# Patient Record
Sex: MALE | Race: WHITE | Employment: FULL TIME | URBAN - METROPOLITAN AREA
[De-identification: names, ages, dates, MRNs, and addresses within clinical notes are randomized per-mention and may not be internally consistent; named-entity substitution may affect disease eponyms.]

---

## 2017-10-04 ENCOUNTER — OFFICE VISIT (OUTPATIENT)
Dept: FAMILY MEDICINE | Facility: CLINIC | Age: 32
End: 2017-10-04
Payer: COMMERCIAL

## 2017-10-04 ENCOUNTER — TELEPHONE (OUTPATIENT)
Dept: FAMILY MEDICINE | Facility: CLINIC | Age: 32
End: 2017-10-04

## 2017-10-04 VITALS
BODY MASS INDEX: 33.49 KG/M2 | DIASTOLIC BLOOD PRESSURE: 78 MMHG | HEART RATE: 84 BPM | TEMPERATURE: 98.2 F | SYSTOLIC BLOOD PRESSURE: 122 MMHG | HEIGHT: 68 IN | WEIGHT: 221 LBS

## 2017-10-04 DIAGNOSIS — Z13.6 CARDIOVASCULAR SCREENING; LDL GOAL LESS THAN 160: ICD-10-CM

## 2017-10-04 DIAGNOSIS — Z00.00 ROUTINE HISTORY AND PHYSICAL EXAMINATION OF ADULT: Primary | ICD-10-CM

## 2017-10-04 DIAGNOSIS — Z23 FLU VACCINE NEED: ICD-10-CM

## 2017-10-04 PROCEDURE — 90686 IIV4 VACC NO PRSV 0.5 ML IM: CPT | Performed by: FAMILY MEDICINE

## 2017-10-04 PROCEDURE — 99385 PREV VISIT NEW AGE 18-39: CPT | Mod: 25 | Performed by: FAMILY MEDICINE

## 2017-10-04 PROCEDURE — 90471 IMMUNIZATION ADMIN: CPT | Performed by: FAMILY MEDICINE

## 2017-10-04 RX ORDER — DEXTROAMPHETAMINE SACCHARATE, AMPHETAMINE ASPARTATE, DEXTROAMPHETAMINE SULFATE AND AMPHETAMINE SULFATE 5; 5; 5; 5 MG/1; MG/1; MG/1; MG/1
20 TABLET ORAL 2 TIMES DAILY
COMMUNITY
End: 2021-06-29

## 2017-10-04 NOTE — PROGRESS NOTES
SUBJECTIVE:   CC: Boris Berumen is an 32 year old male who presents for preventative health visit.     Physical   Annual:     Getting at least 3 servings of Calcium per day::  Yes    Bi-annual eye exam::  NO    Dental care twice a year::  Yes    Sleep apnea or symptoms of sleep apnea::  Excessive snoring    Diet::  Regular (no restrictions)    Frequency of exercise::  4-5 days/week    Duration of exercise::  45-60 minutes    Taking medications regularly::  Yes    Medication side effects::  Not applicable    Additional concerns today::  YES (Has 2 forms he needs completed:  [employer] and [employer] )    ADHD. Inattentiveness for several years, diagnosed with ADHD two years ago. On Adderall, provided by another physician. No adverse effects.     Tobacco use. Smokes a couple of days a week, usually in social settings.     I reviewed his family history. No family history of premature CAD, breast cancer, colon cancer, or prostate cancer.       Today's PHQ-2 Score:   PHQ-2 ( 1999 Pf[employer]) 10/4/2017   Q1: Little interest or pleasure in doing things 0   Q2: Feeling down, depressed or hopeless 0   PHQ-2 Score 0   Q1: Little interest or pleasure in doing things Not at all   Q2: Feeling down, depressed or hopeless Not at all   PHQ-2 Score 0       Abuse: Current or Past(Physical, Sexual or Emotional)- No  Do you feel safe in your environment - Yes    Social History   Substance Use Topics     Smoking status: Not on file     Smokeless tobacco: Not on file     Alcohol use Not on file     The patient does not drink >3 drinks per day nor >7 drinks per week.    Last PSA: No results found for: PSA    Reviewed orders with patient. Reviewed health maintenance and updated orders accordingly - Yes  Labs reviewed in EPIC  BP Readings from Last 3 Encounters:   10/04/17 122/78    Wt Readings from Last 3 Encounters:   10/04/17 100.2 kg (221 lb)                  There is no problem list on file for this patient.    History  "reviewed. No pertinent surgical history.    Social History   Substance Use Topics     Smoking status: Current Some Day Smoker     Smokeless tobacco: Never Used     Alcohol use Yes      Comment: couple drinks per week socially     History reviewed. No pertinent family history.      Current Outpatient Prescriptions   Medication Sig Dispense Refill     amphetamine-dextroamphetamine (ADDERALL) 20 MG per tablet Take 20 mg by mouth 2 times daily       melatonin 0.5 mg TABS half-tab Take 1 mg by mouth nightly as needed for sleep       loratadine-pseudoePHEDrine (CLARITIN-D 12 HOUR) 5-120 MG per 12 hr tablet Take 1 tablet by mouth 2 times daily       Not on File  No lab results found.           Reviewed and updated as needed this visit by clinical staff         Reviewed and updated as needed this visit by Provider            ROS:  C: NEGATIVE for fever, chills, change in weight  I: NEGATIVE for worrisome rashes or lesions  E: NEGATIVE for vision changes or irritation  ENT: NEGATIVE for ear, mouth and throat problems  R: NEGATIVE for significant cough or SOB  CV: NEGATIVE for chest pain, palpitations or peripheral edema  GI: NEGATIVE for nausea, abdominal pain, heartburn, or change in bowel habits   male: negative for dysuria, hematuria, decreased urinary stream, erectile dysfunction, urethral discharge  M: NEGATIVE for significant arthralgias or myalgia  N: NEGATIVE for weakness, dizziness or paresthesias  P: NEGATIVE for changes in mood or affect    This document serves as a record of the services and decisions personally performed and made by Hawk Elias MD. It was created on their behalf by Matteo Clarke, a trained medical scribe. The creation of this document is based the provider's statements to the medical scribe.  Matteo Clarke October 4, 2017 1:12 PM       OBJECTIVE:   /78 (BP Location: Right arm, Cuff Size: Adult Large)  Pulse 84  Temp 98.2  F (36.8  C) (Oral)  Ht 1.734 m (5' 8.25\")  Wt 100.2 kg (221 lb)  " BMI 33.36 kg/m2    EXAM:  GENERAL: overweight, alert and no distress  EYES: Eyes grossly normal to inspection, PERRL and conjunctivae and sclerae normal  HENT: ear canals and TM's normal, nose and mouth without ulcers or lesions  NECK: no adenopathy, no asymmetry, masses, or scars and thyroid normal to palpation  RESP: lungs clear to auscultation - no rales, rhonchi or wheezes  CV: regular rate and rhythm, normal S1 S2, no S3 or S4, no murmur, click or rub, no peripheral edema and peripheral pulses strong  ABDOMEN: soft, nontender, no hepatosplenomegaly, no masses and bowel sounds normal  MS: no gross musculoskeletal defects noted, no edema  SKIN: no suspicious lesions or rashes -- numerous moles  NEURO: Normal strength and tone, mentation intact and speech normal  PSYCH: mentation appears normal, affect normal/bright  LYMPH: no cervical, supraclavicular, axillary, or inguinal adenopathy    ASSESSMENT/PLAN:   (Z00.00) Routine history and physical examination of adult  (primary encounter diagnosis)  Comment: Healthy lifestyle changes including changes in nutrition and exercise were discussed. Emphasized the importance of weight loss.   Plan: **Lipid panel reflex to direct LDL FUTURE         anytime, **Glucose FUTURE anytime, C FLU VAC         QUADRIVALENT SPLIT VIRUS 3+YRS IM        -follow up, pending results    (Z13.6) CARDIOVASCULAR SCREENING; LDL GOAL LESS THAN 160  Comment:   Plan: **Lipid panel reflex to direct LDL FUTURE         anytime        -await lab results    (Z23) Flu vaccine need  Comment:   Plan: C FLU VAC QUADRIVALENT SPLIT VIRUS 3+YRS IM                COUNSELING:   Reviewed preventive health counseling, as reflected in patient instructions       Regular exercise       Healthy diet/nutrition       Colon cancer screening       Prostate cancer screening       Testicular cancer screening    BP Screening:   Last 3 BP Readings:    BP Readings from Last 3 Encounters:   10/04/17 122/78       The following  "was recommended to the patient:  Re-screen BP within a year and recommended lifestyle modifications       reports that he has been smoking.  He has never used smokeless tobacco.  Tobacco Cessation Action Plan: Information offered: Patient not interested at this time  Estimated body mass index is 33.36 kg/(m^2) as calculated from the following:    Height as of this encounter: 1.734 m (5' 8.25\").    Weight as of this encounter: 100.2 kg (221 lb).   Weight management plan: Discussed healthy diet and exercise guidelines and patient will follow up in 12 months in clinic to re-evaluate.    Counseling Resources:  ATP IV Guidelines  Pooled Cohorts Equation Calculator  FRAX Risk Assessment  ICSI Preventive Guidelines  Dietary Guidelines for Americans, 2010  USDA's MyPlate  ASA Prophylaxis  Lung CA Screening    The information in this document, created by the medical scribe for me, accurately reflects the services I personally performed and the decisions made by me. I have reviewed and approved this document for accuracy prior to leaving the patient care area.    Robert Elias MD, MD  [facility]  Answers for HPI/ROS submitted by the patient on 10/4/2017   PHQ-2 Score: 0    "

## 2017-10-04 NOTE — PATIENT INSTRUCTIONS
Preventive Health Recommendations  Male Ages 26 - 39    Yearly exam:             See your health care provider every year in order to  o   Review health changes.   o   Discuss preventive care.    o   Review your medicines if your doctor has prescribed any.    You should be tested each year for STDs (sexually transmitted diseases), if you re at risk.     After age 35, talk to your provider about cholesterol testing. If you are at risk for heart disease, have your cholesterol tested at least every 5 years.     If you are at risk for diabetes, you should have a diabetes test (fasting glucose).  Shots: Get a flu shot each year. Get a tetanus shot every 10 years.     Nutrition:    Eat at least 5 servings of fruits and vegetables daily.     Eat whole-grain bread, whole-wheat pasta and brown rice instead of white grains and rice.     Talk to your provider about Calcium and Vitamin D.     Lifestyle    Exercise for at least 150 minutes a week (30 minutes a day, 5 days a week). This will help you control your weight and prevent disease.     Limit alcohol to one drink per day.     No smoking.     Wear sunscreen to prevent skin cancer.     See your dentist every six months for an exam and cleaning.   -follow up for labs only     Orders Placed This Encounter     C FLU VAC QUADRIVALENT SPLIT VIRUS 3+YRS IM     **Lipid panel reflex to direct LDL FUTURE anytime     Last Lab Result: No results found for: LDL     Standing Status:   Future     Standing Expiration Date:   10/4/2018     Order Specific Question:   Perform labs while fasting or non-fasting?     Answer:   Fasting     **Glucose FUTURE anytime     Standing Status:   Future     Standing Expiration Date:   10/4/2018     amphetamine-dextroamphetamine (ADDERALL) 20 MG per tablet     Sig: Take 20 mg by mouth 2 times daily     melatonin 0.5 mg TABS half-tab     Sig: Take 1 mg by mouth nightly as needed for sleep     loratadine-pseudoePHEDrine (CLARITIN-D 12 HOUR) 5-120 MG per 12  hr tablet     Sig: Take 1 tablet by mouth 2 times daily

## 2017-10-04 NOTE — MR AVS SNAPSHOT
After Visit Summary   10/4/2017    Boris Berumen    MRN: [id number]           Patient Information     Date Of Birth          1985        Visit Information        Provider Department      10/4/2017 12:40 PM Robert Elias MD [facility]        Today's Diagnoses     Routine history and physical examination of adult    -  1    CARDIOVASCULAR SCREENING; LDL GOAL LESS THAN 160        Flu vaccine need          Care Instructions      Preventive Health Recommendations  Male Ages 26 - 39    Yearly exam:             See your health care provider every year in order to  o   Review health changes.   o   Discuss preventive care.    o   Review your medicines if your doctor has prescribed any.    You should be tested each year for STDs (sexually transmitted diseases), if you re at risk.     After age 35, talk to your provider about cholesterol testing. If you are at risk for heart disease, have your cholesterol tested at least every 5 years.     If you are at risk for diabetes, you should have a diabetes test (fasting glucose).  Shots: Get a flu shot each year. Get a tetanus shot every 10 years.     Nutrition:    Eat at least 5 servings of fruits and vegetables daily.     Eat whole-grain bread, whole-wheat pasta and brown rice instead of white grains and rice.     Talk to your provider about Calcium and Vitamin D.     Lifestyle    Exercise for at least 150 minutes a week (30 minutes a day, 5 days a week). This will help you control your weight and prevent disease.     Limit alcohol to one drink per day.     No smoking.     Wear sunscreen to prevent skin cancer.     See your dentist every six months for an exam and cleaning.   -follow up for labs only     Orders Placed This Encounter     C FLU VAC QUADRIVALENT SPLIT VIRUS 3+YRS IM     **Lipid panel reflex to direct LDL FUTURE anytime     Last Lab Result: No results found for: LDL     Standing Status:   Future     Standing Expiration  "Date:   10/4/2018     Order Specific Question:   Perform labs while fasting or non-fasting?     Answer:   Fasting     **Glucose FUTURE anytime     Standing Status:   Future     Standing Expiration Date:   10/4/2018     amphetamine-dextroamphetamine (ADDERALL) 20 MG per tablet     Sig: Take 20 mg by mouth 2 times daily     melatonin 0.5 mg TABS half-tab     Sig: Take 1 mg by mouth nightly as needed for sleep     loratadine-pseudoePHEDrine (CLARITIN-D 12 HOUR) 5-120 MG per 12 hr tablet     Sig: Take 1 tablet by mouth 2 times daily               Follow-ups after your visit        Future tests that were ordered for you today     Open Future Orders        Priority Expected Expires Ordered    **Lipid panel reflex to direct LDL FUTURE anytime Routine 10/4/2017 10/4/2018 10/4/2017    **Glucose FUTURE anytime Routine 10/4/2017 10/4/2018 10/4/2017            Who to contact     If you have questions or need follow up information about today's clinic visit or your schedule please contact [facility] directly at [phone].  Normal or non-critical lab and imaging results will be communicated to you by [employer], letter or phone within 4 business days after the clinic has received the results. If you do not hear from us within 7 days, please contact the clinic through [employer] or phone. If you have a critical or abnormal lab result, we will notify you by phone as soon as possible.  Submit refill requests through [employer] or call your pharmacy and they will forward the refill request to us. Please allow 3 business days for your refill to be completed.          Additional Information About Your Visit        [employer] Information     [employer] lets you send messages to your doctor, view your test results, renew your prescriptions, schedule appointments and more. To sign up, go to [email or web address] . Click on \"Log in\" on the left side of the screen, which will take you to the Welcome page. Then click on \"Sign up " "Now\" on the right side of the page.     You will be asked to enter the access code listed below, as well as some personal information. Please follow the directions to create your username and password.     Your access code is: [email or web address]  Expires: 2018  1:42 PM     Your access code will  in 90 days. If you need help or a new code, please call your [place] clinic or [phone].        Care EveryWhere ID     This is your Care EveryWhere ID. This could be used by other organizations to access your [place] medical records  [id number]        Your Vitals Were     Pulse Temperature Height BMI (Body Mass Index)          84 98.2  F (36.8  C) (Oral) 5' 8.25\" (1.734 m) 33.36 kg/m2         Blood Pressure from Last 3 Encounters:   10/04/17 122/78    Weight from Last 3 Encounters:   10/04/17 221 lb (100.2 kg)              We Performed the Following     C FLU VAC QUADRIVALENT SPLIT VIRUS 3+YRS IM        Primary Care Provider    None Specified       No primary provider on file.        Equal Access to Services     [facility]: Hadii pollo Ferrera, waaxda lumarely, qaybta kaalmada tiffanie, anh mcknight . So [facility] [phone].    ATENCIÓN: Si habla español, tiene a murillo disposición servicios gratuitos de asistencia lingüística. Raegan al [phone].    We comply with applicable federal civil rights laws and [place] laws. We do not discriminate on the basis of race, color, national origin, age, disability, sex, sexual orientation, or gender identity.            Thank you!     Thank you for choosing [facility]  for your care. Our goal is always to provide you with excellent care. Hearing back from our patients is one way we can continue to improve our services. Please take a few minutes to complete the written survey that you may receive in the mail after your visit with us. Thank you!             Your Updated Medication List - Protect others around you: " Learn how to safely use, store and throw away your medicines at www.disposemymeds.org.          This list is accurate as of: 10/4/17  1:42 PM.  Always use your most recent med list.                   Brand Name Dispense Instructions for use Diagnosis    ADDERALL 20 MG per tablet   Generic drug:  amphetamine-dextroamphetamine      Take 20 mg by mouth 2 times daily        CLARITIN-D 12 HOUR 5-120 MG per 12 hr tablet   Generic drug:  loratadine-pseudoePHEDrine      Take 1 tablet by mouth 2 times daily        melatonin 0.5 mg Tabs half-tab      Take 1 mg by mouth nightly as needed for sleep

## 2017-10-04 NOTE — PROGRESS NOTES
Injectable Influenza Immunization Documentation    1.  Is the person to be vaccinated sick today?   No    2. Does the person to be vaccinated have an allergy to a component   of the vaccine?   No    3. Has the person to be vaccinated ever had a serious reaction   to influenza vaccine in the past?   No    4. Has the person to be vaccinated ever had Guillain-Barré syndrome?   No    Form completed by Simran Renae, Certified Medical Assistant (AAMA)

## 2017-10-04 NOTE — NURSING NOTE
Chief Complaint   Patient presents with     Physical       Initial There were no vitals taken for this visit. There is no height or weight on file to calculate BMI.  Medication Reconciliation: complete   Jenni Yepez, CMA

## 2017-10-04 NOTE — TELEPHONE ENCOUNTER
Patient was seen in clinic today for physical with Dr. Elias.  Dropped off 2 forms that he needs completed ([employer] biometric).    When lab results are in (for biometric form), please contact patient at home number when forms are completed and ready for him to  (FAX BIOMETRIC FORM FIRST TO:  [employer] fax #[phone])    Forms put into MA folder waiting for lab results    Jenni Yepez, CMA

## 2017-10-05 DIAGNOSIS — Z00.00 ROUTINE HISTORY AND PHYSICAL EXAMINATION OF ADULT: ICD-10-CM

## 2017-10-05 DIAGNOSIS — Z13.6 CARDIOVASCULAR SCREENING; LDL GOAL LESS THAN 160: ICD-10-CM

## 2017-10-05 PROCEDURE — 80061 LIPID PANEL: CPT | Performed by: FAMILY MEDICINE

## 2017-10-05 PROCEDURE — 36415 COLL VENOUS BLD VENIPUNCTURE: CPT | Performed by: FAMILY MEDICINE

## 2017-10-05 PROCEDURE — 82947 ASSAY GLUCOSE BLOOD QUANT: CPT | Performed by: FAMILY MEDICINE

## 2017-10-06 LAB
CHOLEST SERPL-MCNC: 187 MG/DL
GLUCOSE SERPL-MCNC: 94 MG/DL (ref 70–99)
HDLC SERPL-MCNC: 52 MG/DL
LDLC SERPL CALC-MCNC: 102 MG/DL
NONHDLC SERPL-MCNC: 135 MG/DL
TRIGL SERPL-MCNC: 163 MG/DL

## 2017-10-06 NOTE — TELEPHONE ENCOUNTER
Amador placed in Dr. Elias's sign me folder for review.    Madison Harding CMA ([facility])

## 2017-10-10 PROBLEM — F90.2 ATTENTION DEFICIT HYPERACTIVITY DISORDER (ADHD), COMBINED TYPE: Status: ACTIVE | Noted: 2017-10-10

## 2017-10-10 NOTE — TELEPHONE ENCOUNTER
Wife calling to check to on status of forms.  They are needed in order to get on the adoption list.  Please complete and call wife when they have been faxed, number listed above.

## 2017-10-11 NOTE — TELEPHONE ENCOUNTER
Forms from [employer] faxed to [phone].    [employer] forms needs to be signed by patient. Forms placed at  for pickup.    LMOM for patient. Unable to discuss with wife, no consent to communicate on file.    Maggi Hannah,

## 2017-10-13 NOTE — TELEPHONE ENCOUNTER
Patient stopped by the  and signed the forms below.  Per patient request a copy of the form was faxed to [phone]  [facility].  A copy was made for his chart and put in Medical records box, for scanning.    Morenita Carrneo  Patient Representative

## 2018-09-25 ENCOUNTER — THERAPY VISIT (OUTPATIENT)
Dept: PHYSICAL THERAPY | Facility: CLINIC | Age: 33
End: 2018-09-25
Payer: COMMERCIAL

## 2018-09-25 DIAGNOSIS — M25.511 ACUTE PAIN OF RIGHT SHOULDER: Primary | ICD-10-CM

## 2018-09-25 DIAGNOSIS — M25.562 ACUTE PAIN OF LEFT KNEE: ICD-10-CM

## 2018-09-25 PROCEDURE — 97110 THERAPEUTIC EXERCISES: CPT | Mod: GP | Performed by: PHYSICAL THERAPIST

## 2018-09-25 PROCEDURE — 97161 PT EVAL LOW COMPLEX 20 MIN: CPT | Mod: GP | Performed by: PHYSICAL THERAPIST

## 2018-09-25 PROCEDURE — 97112 NEUROMUSCULAR REEDUCATION: CPT | Mod: GP | Performed by: PHYSICAL THERAPIST

## 2018-09-25 ASSESSMENT — ACTIVITIES OF DAILY LIVING (ADL)
GO UP STAIRS: ACTIVITY IS SOMEWHAT DIFFICULT
LIMPING: THE SYMPTOM AFFECTS MY ACTIVITY MODERATELY
GIVING WAY, BUCKLING OR SHIFTING OF KNEE: THE SYMPTOM AFFECTS MY ACTIVITY SEVERELY
KNEE_ACTIVITY_OF_DAILY_LIVING_SCORE: 47.14
WALK: ACTIVITY IS MINIMALLY DIFFICULT
KNEE_ACTIVITY_OF_DAILY_LIVING_SUM: 33
RISE FROM A CHAIR: ACTIVITY IS MINIMALLY DIFFICULT
HOW_WOULD_YOU_RATE_THE_OVERALL_FUNCTION_OF_YOUR_KNEE_DURING_YOUR_USUAL_DAILY_ACTIVITIES?: ABNORMAL
AS_A_RESULT_OF_YOUR_KNEE_INJURY,_HOW_WOULD_YOU_RATE_YOUR_CURRENT_LEVEL_OF_DAILY_ACTIVITY?: SEVERELY ABNORMAL
SIT WITH YOUR KNEE BENT: ACTIVITY IS NOT DIFFICULT
KNEEL ON THE FRONT OF YOUR KNEE: I AM UNABLE TO DO THE ACTIVITY
WEAKNESS: THE SYMPTOM AFFECTS MY ACTIVITY SEVERELY
SQUAT: ACTIVITY IS VERY DIFFICULT
PAIN: THE SYMPTOM AFFECTS MY ACTIVITY SEVERELY
SWELLING: THE SYMPTOM AFFECTS MY ACTIVITY SEVERELY
STIFFNESS: THE SYMPTOM AFFECTS MY ACTIVITY MODERATELY
RAW_SCORE: 33
STAND: ACTIVITY IS MINIMALLY DIFFICULT
GO DOWN STAIRS: ACTIVITY IS MINIMALLY DIFFICULT
HOW_WOULD_YOU_RATE_THE_CURRENT_FUNCTION_OF_YOUR_KNEE_DURING_YOUR_USUAL_DAILY_ACTIVITIES_ON_A_SCALE_FROM_0_TO_100_WITH_100_BEING_YOUR_LEVEL_OF_KNEE_FUNCTION_PRIOR_TO_YOUR_INJURY_AND_0_BEING_THE_INABILITY_TO_PERFORM_ANY_OF_YOUR_USUAL_DAILY_ACTIVITIES?: 20

## 2018-09-25 NOTE — LETTER
[facility]  [address]  [place] [address]  [phone]    2018    Re: Boris Berumen   :   1985  MRN:  [id number]   REFERRING PHYSICIAN:   [facility]    Date of Initial Evaluation:  18  Visits:  Rxs Used: 1  Reason for Referral:     Acute pain of right shoulder  Acute pain of left knee    [facility] Initial Evaluation    Subjective:  HPI Comments: SPADI 25/100, greatest functional limit is laying on side at night and reaching to a high shelf   Knee Disability Index , greatest  functional limits are stair reciprocation and squatting   Patient is a 33 year old male presenting with rehab right shoulder hpi. The history is provided by the patient. No  was used.   Boris Berumen is a 33 year old male with a right shoulder condition.  Condition occurred with:  Lifting and contact with object.  Condition occurred: during recreation/sport.  This is a recurrent condition  18.    Patient reports pain:  Anterior and lateral.  Radiates to:  Shoulder.  Pain is described as aching and sharp and is intermittent and reported as 3/10.  Associated symptoms:  Loss of strength, painful arc and dislocating/subluxing. Pain is the same all the time and worse during the night.  Symptoms are exacerbated by carrying, lifting, lying on extremity, using arm behind back, using arm at shoulder level and using arm overhead and relieved by NSAID's.  Since onset symptoms are unchanged.  Special tests:  MRI and x-ray.    There was moderate improvement following previous treatment.  General health as reported by patient is good.  Pertinent medical history includes:  Smoking.  Medical allergies: no.  Other surgeries include:  None reported.  Current medications:  None as reported by the patient.  Current occupation is Unemployed .      Barriers include:  None as reported by the patient.  Red flags:  None as reported by  the patient.  Knee Activity of Daily Living Score: 47.14            Objective:  Standing Alignment:    Cervical/Thoracic:  Forward head  Shoulder/UE:  Rounded shoulders    Gait:    Gait Type:  Normal   Weight Bearing Status:  WBAT   Assistive Devices:  None    Neurological: He is alert. He has normal reflexes. No cranial nerve deficit or sensory deficit.        Shoulder Evaluation:  ROM:    PROM:    Flexion:  Left:  160    Right: 160    Internal Rotation:  Left:  90    Right:  90  External Rotation:  Left:  80    Right:  60, apprehensive     Strength:  : weak and painful Abduction, ER, Adduction, IR 4-/5 strength.    Stability Testing:    Right shoulder stability positive testing:Apprehension and Relocation    Special Tests:    Right shoulder positive for the following special tests:Labral and Impingement    Knee Evaluation:  ROM:  Strength wnl knee: strong quad set and SLR without lag, however: core, hip strength weak.    PROM  Hyperextension: Left: 5    Right:  5  Extension: Left: 0    Right:  0  Flexion: Left: 135    Right:  135    Ligament Testing:  Not Assessed    Special Tests:   Right knee positive for the following tests:  Patellar Compression    Palpation:  Palpation of knee: MPFL   Right knee tenderness present at:  Patellar Medial; Patellar Lateral and Patellar Inferior    Edema:  Normal    Assessment/Plan:    Patient is a 33 year old male with right side shoulder and right side knee complaints.    Patient has the following significant findings with corresponding treatment plan.                Diagnosis 1:  R anterior shoulder instability     Diagnosis 2:  R Patellar instability        Therapy Evaluation Codes:   1) History comprised of:   Personal factors that impact the plan of care:      Age, Time since onset of symptoms and repeated dislocation/instability .    Comorbidity factors that impact the plan of care are:      Smoking.     Medications impacting care: Anti-inflammatory.  2) Examination of Body  Systems comprised of:   Body structures and functions that impact the plan of care:      Knee and Shoulder.   Activity limitations that impact the plan of care are:      Cooking, Driving, Dressing, Lifting, Running, Sitting, Sports, Squatting/kneeling, Stairs, Standing, Walking, Working, Sleeping and Laying down.  3) Clinical presentation characteristics are:   Stable/Uncomplicated.  4) Decision-Making    Low complexity using standardized patient assessment instrument and/or measureable assessment of functional outcome.    Cumulative Therapy Evaluation is: Low complexity.  Previous and current functional limitations:  (See Goal Flow Sheet for this information)    Short term and Long term goals: (See Goal Flow Sheet for this information)   Communication ability:  Patient appears to be able to clearly communicate and understand verbal and written communication and follow directions correctly.  Treatment Explanation - The following has been discussed with the patient:   RX ordered/plan of care  Anticipated outcomes  Possible risks and side effects  This patient would benefit from PT intervention to resume normal activities.   Rehab potential is good.    Frequency:  1 X week, once daily  Duration:  for 6 weeks  Discharge Plan:  Achieve all LTG.  Independent in home treatment program.  Reach maximal therapeutic benefit.    Thank you for your referral.    INQUIRIES  Therapist: Elvis Quinn, PT, ATC, Cert. [facility]  [address] [place] [address]  Phone: [phone]  Fax: [phone]

## 2018-09-30 PROBLEM — M25.511 ACUTE PAIN OF RIGHT SHOULDER: Status: ACTIVE | Noted: 2018-09-30

## 2018-09-30 PROBLEM — M25.562 ACUTE PAIN OF LEFT KNEE: Status: ACTIVE | Noted: 2018-09-30

## 2018-10-02 ENCOUNTER — THERAPY VISIT (OUTPATIENT)
Dept: PHYSICAL THERAPY | Facility: CLINIC | Age: 33
End: 2018-10-02
Payer: COMMERCIAL

## 2018-10-02 DIAGNOSIS — M25.511 ACUTE PAIN OF RIGHT SHOULDER: ICD-10-CM

## 2018-10-02 DIAGNOSIS — M25.562 ACUTE PAIN OF LEFT KNEE: ICD-10-CM

## 2018-10-02 PROCEDURE — 97110 THERAPEUTIC EXERCISES: CPT | Mod: GP | Performed by: PHYSICAL THERAPIST

## 2018-10-02 PROCEDURE — 97112 NEUROMUSCULAR REEDUCATION: CPT | Mod: GP | Performed by: PHYSICAL THERAPIST

## 2018-10-02 NOTE — PROGRESS NOTES
[facility] Initial Evaluation  Subjective:  HPI Comments: SPADI 25/100, greatest functional limit is laying on side at night and reaching to a high shelf     Knee Disability Index , greatest  functional limits are stair reciprocation and squatting     Patient is a 33 year old male presenting with rehab right shoulder hpi. The history is provided by the patient. No  was used.   Boris Berumen is a 33 year old male with a right shoulder condition.  Condition occurred with:  Lifting and contact with object.  Condition occurred: during recreation/sport.  This is a recurrent condition  9/11/18.    Patient reports pain:  Anterior and lateral.  Radiates to:  Shoulder.  Pain is described as aching and sharp and is intermittent and reported as 3/10.  Associated symptoms:  Loss of strength, painful arc and dislocating/subluxing. Pain is the same all the time and worse during the night.  Symptoms are exacerbated by carrying, lifting, lying on extremity, using arm behind back, using arm at shoulder level and using arm overhead and relieved by NSAID's.  Since onset symptoms are unchanged.  Special tests:  MRI and x-ray.    There was moderate improvement following previous treatment.  General health as reported by patient is good.  Pertinent medical history includes:  Smoking.  Medical allergies: no.  Other surgeries include:  None reported.  Current medications:  None as reported by the patient.  Current occupation is Unemployed .        Barriers include:  None as reported by the patient.    Red flags:  None as reported by the patient.           Knee Activity of Daily Living Score: 47.14            Objective:  Standing Alignment:    Cervical/Thoracic:  Forward head  Shoulder/UE:  Rounded shoulders              Gait:    Gait Type:  Normal   Weight Bearing Status:  WBAT   Assistive Devices:  None        Neurological: He is alert. He has normal reflexes. No cranial nerve deficit or sensory  deficit.                      Shoulder Evaluation:  ROM:    PROM:    Flexion:  Left:  160    Right: 160          Internal Rotation:  Left:  90    Right:  90  External Rotation:  Left:  80    Right:  60, apprehensive                     Strength:  : weak and painful Abduction, ER, Adduction, IR 4-/5 strength.                      Stability Testing:      Right shoulder stability positive testing:Apprehension and Relocation  Special Tests:      Right shoulder positive for the following special tests:Labral and Impingement                                 Knee Evaluation:  ROM:  Strength wnl knee: strong quad set and SLR without lag, however: core, hip strength weak.    PROM    Hyperextension: Left: 5    Right:  5  Extension: Left: 0    Right:  0  Flexion: Left: 135    Right:  135        Ligament Testing:  Not Assessed                Special Tests:     Right knee positive for the following tests:  Patellar Compression  Palpation:  Palpation of knee: MPFL     Right knee tenderness present at:  Patellar Medial; Patellar Lateral and Patellar Inferior  Edema:  Normal            General     ROS    Assessment/Plan:    Patient is a 33 year old male with right side shoulder and right side knee complaints.    Patient has the following significant findings with corresponding treatment plan.                Diagnosis 1:  R anterior shoulder instability     Diagnosis 2:  R Patellar instability        Therapy Evaluation Codes:   1) History comprised of:   Personal factors that impact the plan of care:      Age, Time since onset of symptoms and repeated dislocation/instability .    Comorbidity factors that impact the plan of care are:      Smoking.     Medications impacting care: Anti-inflammatory.  2) Examination of Body Systems comprised of:   Body structures and functions that impact the plan of care:      Knee and Shoulder.   Activity limitations that impact the plan of care are:      Cooking, Driving, Dressing, Lifting, Running,  Sitting, Sports, Squatting/kneeling, Stairs, Standing, Walking, Working, Sleeping and Laying down.  3) Clinical presentation characteristics are:   Stable/Uncomplicated.  4) Decision-Making    Low complexity using standardized patient assessment instrument and/or measureable assessment of functional outcome.  Cumulative Therapy Evaluation is: Low complexity.    Previous and current functional limitations:  (See Goal Flow Sheet for this information)    Short term and Long term goals: (See Goal Flow Sheet for this information)     Communication ability:  Patient appears to be able to clearly communicate and understand verbal and written communication and follow directions correctly.  Treatment Explanation - The following has been discussed with the patient:   RX ordered/plan of care  Anticipated outcomes  Possible risks and side effects  This patient would benefit from PT intervention to resume normal activities.   Rehab potential is good.    Frequency:  1 X week, once daily  Duration:  for 6 weeks  Discharge Plan:  Achieve all LTG.  Independent in home treatment program.  Reach maximal therapeutic benefit.    Please refer to the daily flowsheet for treatment today, total treatment time and time spent performing 1:1 timed codes.

## 2018-12-06 ENCOUNTER — OFFICE VISIT (OUTPATIENT)
Dept: FAMILY MEDICINE | Facility: CLINIC | Age: 33
End: 2018-12-06
Payer: COMMERCIAL

## 2018-12-06 VITALS
WEIGHT: 210.6 LBS | SYSTOLIC BLOOD PRESSURE: 128 MMHG | RESPIRATION RATE: 20 BRPM | DIASTOLIC BLOOD PRESSURE: 68 MMHG | HEIGHT: 69 IN | BODY MASS INDEX: 31.19 KG/M2 | HEART RATE: 85 BPM | TEMPERATURE: 98.4 F | OXYGEN SATURATION: 100 %

## 2018-12-06 DIAGNOSIS — Z87.898 HISTORY OF SNORING: ICD-10-CM

## 2018-12-06 DIAGNOSIS — F17.200 TOBACCO USE DISORDER: ICD-10-CM

## 2018-12-06 DIAGNOSIS — M25.361 PATELLAR INSTABILITY OF RIGHT KNEE: ICD-10-CM

## 2018-12-06 DIAGNOSIS — Z01.818 PREOP GENERAL PHYSICAL EXAM: Primary | ICD-10-CM

## 2018-12-06 PROCEDURE — 93000 ELECTROCARDIOGRAM COMPLETE: CPT | Performed by: NURSE PRACTITIONER

## 2018-12-06 PROCEDURE — 99214 OFFICE O/P EST MOD 30 MIN: CPT | Performed by: NURSE PRACTITIONER

## 2018-12-06 ASSESSMENT — PAIN SCALES - GENERAL: PAINLEVEL: NO PAIN (0)

## 2018-12-06 NOTE — MR AVS SNAPSHOT
After Visit Summary   12/6/2018    Boris Berumen    MRN: [id number]           Patient Information     Date Of Birth          1985        Visit Information        Provider Department      12/6/2018 7:40 AM Verna Morel NP [facility]        Today's Diagnoses     Preop general physical exam    -  1    Patellar instability of right knee        History of snoring        Tobacco use disorder        Need for prophylactic vaccination and inoculation against influenza          Care Instructions    Pre-operation Instructions:    - Stop Aspirin and NSAIDS (Ibuprofen, Motrin, Advil, Aleve, Naproxen, Naprosyn) 7 days prior to the surgery/procedure or follow surgeon's direction.    - Stop all Vitamins and Herbal Supplements (including Vitamin E, Fish Oil, Omega 3 Fatty Acids) 7 days prior to the surgery/procedure or follow surgeon's direction.    - Medications:  Continue your medications the morning of your surgery/procedure.    - If you become sick before your surgery/procedure (such as a fever, cough, congestion, or sore throat) you should call your primary care provider and surgeon.    - Unless given permission by your surgeon, do not eat or drink after midnight in the evening prior to your surgery/procedure.   Before Your Surgery      Call your surgeon if there is any change in your health. This includes signs of a cold or flu (such as a sore throat, runny nose, cough, rash or fever).    Do not smoke, drink alcohol or take over the counter medicine (unless your surgeon or primary care doctor tells you to) for the 24 hours before and after surgery.    If you take prescribed drugs: Follow your doctor s orders about which medicines to take and which to stop until after surgery.    Eating and drinking prior to surgery: follow the instructions from your surgeon    Take a shower or bath the night before surgery. Use the soap your surgeon gave you to gently clean your skin. If you do not  have soap from your surgeon, use your regular soap. Do not shave or scrub the surgery site.  Wear clean pajamas and have clean sheets on your bed.     Learning to [place]      Coping is the key to successfully living a life without cigarettes.    You have unconsciously connected smoking with many behaviors and feeling that you experience every day of your life.  Engaging in that behavior or experiencing that feeling automatically triggers a desire for a cigarette. Unless you do something to prevent those urges from occurring and learn to deal with the urges that do occur, you may be tempted back to smoking. Coping breaks all those connections and allows you to live a life free of cigarettes.  Coping does not mean that you have to completely stop living your life and join a monastery! It does mean that you must work at changing how you do many of the routines that prompt you to smoke. It also means changing how you think in those tempting situations.  These techniques are all simple and doable. But they are powerful. Research and practical experience has proven time and again that these techniques help to eliminate urges as well as give you the tools to deal with urges that manage to slip through.  Throughout the next few pages you will find literally hundreds of suggestions on how to deal with situations that trigger most smokers to smoke.  Think about the situations where you have been especially tempted to smoke in the past. Refer to the coping suggestions for each of those situations. Then, determine the best coping choices for you. These techniques will be your  weapons of choice  the next time you encounter that situation. It is important to pick one coping technique to change what you do and one to change how you think for each trigger. Combining techniques makes them even stronger and increases your ability to successfully cope.  Even though there are plenty of excellent coping suggestions here, these are by no  means all the techniques that exist. So, if you have an idea that s not listed here don t be afraid to use it. Be creative!  Finally remember that no matter how many excellent ideas you come up with you must actually put them into practice. Work at this for at least six to eight weeks and you ll quickly learn to deal with any tempting situation that may come along!        Coping Menu    Preventing Urges    There are many things you can do before you get into a tempting situation to eliminate the urge to smoke.    Visualize yourself comfortably dealing with the situation without a cigarette.    Plan ahead. Know what you will do in any given situation before you encounter it. Practice that plan often.    Avoid the situation until you feel you can deal with it.    Change the routines you associate with smoking as much as possible.    Rethink your belief that smoking somehow makes your life better or helps you deal with all your problems.    Begin an exercise program. If you can t do anything else just walk as briskly as you can every day for half an hour.    Keep yourself busy. Avoid boring situations where you may begin to think about smoking.    Remind yourself often that you are happy being a nonsmoker and that life is much better without cigarettes.  Coping with temptation  However, sometimes the urge manages to come through. You must be ready to cope with that urge as it s happening. The following suggestions will help you deal with that urge so you aren t tempted back to cigarettes.  General Suggestions    Deep Breathing. Every time an urge hits take in a slow deep breath, hold it for three to five seconds and then slowly exhale.    Drink a glass of water.    Talk about the urge. Call your support person or let people around you know you need to talk for a few minutes.    Escape the situation. Leave until you feel comfortable going back.    Picture a stop sign in your head or say the word loudly to  yourself.    Count to twenty!    Say to yourself,  I am in control  or  I can get through this.     Just accept the though. It s natural that you will have thoughts about cigarettes once you quit. Don t make a big deal out of them. Say to yourself  So what  and let the thought go.    Specific Situations  After Meals    Get up from the table as soon as you are done eating    Brush your teeth after every meal    Always sit in the nonsmoking section of a restaurant    At home have dessert and coffee in a different place from dinner    Take a short walk after each meal  Alcohol    Explore alternative ways to socialize with friends  o Go to a movie  o Work out together  o Have a party without alcohol    If you choose to drink  o Change what you usually drink  o Limit yourself to one or two drinks  o Talk about the urges when they occur  o Leave the bar periodically for fresh air (Do some deep breathing while outside).    Decide not to go to a bar for at least the first few weeks of your quit    Remind yourself that you can have fun without drinking. Millions of people do it all the time!  Boredom    Always carry a book/newspaper/crossword puzzle with you    Plan ahead so that you will not have long periods of inactivity    Learn to enjoy doing nothing from time to time. You do not always have to be doing something important    Use idle time to make the grocery list, plan your schedule or write letters    Start a new hobby or begin an exercise program to fill the time.  Breaks    Take your break at a different time    Change the place where you take your break    Take a short walk instead of staying indoors    Do a crossword puzzle or read a novel    Realize that you don t need an excuse to take a break. You deserve it!      Car    Choose a slightly different route for routine trips    Remove the ashtray from the car    Listen to a talk radio station or books on tape to keep your mind occupied    Use public transportation  for the first few weeks after you quit    Change the environment in the car. Clean the entire interior; get new seat covers, put up a no smoking sign, etc.  Coffee    Drink a flavored coffee or a different brand    Drink coffee out of a glass, paper cup, or the good china you never use    Change where you have your coffee breaks at work    If you always have your morning coffee at home have it at a café or at work    Drink tea instead of coffee  Evenings    Find projects to do while at home. Clean out the basement, refinish furniture, etc.    Keep yourself occupied while watching TV. Do puzzles, make out the grocery list, read a magazine    Visit family or friends instead of staying at home    Begin a new hobby or volunteer at a [employer] organization    Start an exercise program. If you can t do anything else, take a brisk half hour walk each night  Hand/Mouth    Use cinnamon sticks (the kind used for cider)    Suck on sugar free candy    Use straws/swizzle sticks/tooth picks    Chew strong tangy sugar free gum    Eat carrots or celery sticks  Living with another smoker    Negotiate with the other smoker about where and when he/she will smoke. Do not make demands    Have the other smoker keep his/her cigarettes where you will not be able to find them    Practice saying  No thank you, I don t  smoke   just in case someone offers you a cigarette    Don t drink alcohol or limit yourself to one or two drinks    Have a support person with you at the party    Stress Management    Separate cigarettes from the situation. Realize that smoking never made a situation any better or helped you deal with it.    Step back, take a deep breath, and say to yourself,  I can handle this.  Then deal with the problem.    Strategize about how to handle stressful situations with friends, relatives or trusted clergy before encountering those situations.    Realize that every problem has a solution that does not involve smoking.    Begin  an exercise program, take a formal stress management class or learn to meditate.   Telephone    Stand instead of sit    Move the location of the phone    If you don t already have one, get a portable phone or a cell phone    Hold the phone in the hand opposite of the one you usually use    Limit your time on the phone (use email instead)!  Thoughts about smoking    Just because you think about something does not mean you have to do it. Remember, if you did everything you ever thought about you would be in [facility] right now!!    Don t focus on the thought. Distract yourself:  o Say to yourself  I am in control  and let the thought go  o Remind yourself of the benefits of quitting  o Think of the reason you quit. Focus on that  o Say the word stop or picture a stop sign    Accept the thoughts. You naturally will be thinking about cigarettes for a while after you quit. Say to yourself,  So what  and move on    See yourself in your mind s eye as a successful nonsmoker. Practice seeing yourself in all kinds of situations dealing effectively without smoking    Give the smoker one ashtray. They will keep this ashtray clean and out of your sight when not in use    Determine a reasonable length of time for these changes    Surprise the smoker with a special dinner or gift at the end of your first month of quitting as a thank you for their cooperation.  Morning Routine    Change the order of your routine    Jump into the shower as soon as you get up    Eat something for breakfast if you normally do not    If you listen to the radio turn on the TV or vice versa    Look into the mirror first thing each morning and say,  I m proud to be a nonsmoker!   Negative Moods    Rethink your belief that cigarettes will calm or relax you    Ask yourself how a cigarette will make the situation any better    Do deep breathing throughout the day    As you do the deep breathing, think calming thoughts. Say to yourself,  I can get through this  or  simply  I am calm.     Realize that smoking does not hurt anyone but yourself. Smoking is not a good way to  get back  at anyone or to punish someone you are angry with  Other Smokers    Avoid places where you know people are smoking for the first few weeks of your quit    Leave the scene from time to time if you have to be in a smoking environment    Politely explain to the person that you are trying to quit and ask them not to smoke around you    Ask yourself what is still appealing about seeing other people smoke    Realize that the smoker is not happier or having more fun than you are just because they are smoking  Parties/Socializing    Before you go develop and practice a plan to deal with situations    Rehearse going to the function. Close your eyes and see yourself having a good time, meeting people, and enjoying the music all without a cigarette  Weight Gain    Do not diet.  Attempting two major behavior changes at the same time usually leads to failure at both. Wait at least two or three months after quitting before tackling any weight loss program.    Remember, the average weight gain, as a direct result of quitting, is only five to seven pounds. Any weight gain over and above that is due to behavioral changes on the part of the quitter    Drink six to eight glasses of water a day    Begin a modest exercise program. If you can do nothing else, take a brisk half hour walk every day    Remember, smoking does not turn your body into a fat burning machine. If it did, every smoker would be about 100 pounds!!!        Work    Rearrange your office or work space if you can    Put a  No Smoking  sign or motivation poster in your work area    Change your work routine as much as possible    Listen to music, talk radio, or tapes    Have a support person at work            Follow-ups after your visit        Follow-up notes from your care team     Return in about 1 year (around 12/6/2019) for Physical Exam.      Who to  "contact     If you have questions or need follow up information about today's clinic visit or your schedule please contact [facility] directly at [phone].  Normal or non-critical lab and imaging results will be communicated to you by MyChart, letter or phone within 4 business days after the clinic has received the results. If you do not hear from us within 7 days, please contact the clinic through MyChart or phone. If you have a critical or abnormal lab result, we will notify you by phone as soon as possible.  Submit refill requests through [employer] or call your pharmacy and they will forward the refill request to us. Please allow 3 business days for your refill to be completed.          Additional Information About Your Visit        Care EveryWhere ID     This is your Care EveryWhere ID. This could be used by other organizations to access your [facility] medical records  [id number]        Your Vitals Were     Pulse Temperature Respirations Height Pulse Oximetry BMI (Body Mass Index)    85 98.4  F (36.9  C) (Oral) 20 5' 9\" (1.753 m) 100% 31.1 kg/m2       Blood Pressure from Last 3 Encounters:   12/06/18 128/68   10/04/17 122/78    Weight from Last 3 Encounters:   12/06/18 210 lb 9.6 oz (95.5 kg)   10/04/17 221 lb (100.2 kg)              We Performed the Following     EKG 12-lead complete w/read - Clinics        Primary Care Provider Fax #    Physician No Ref-Primary [phone]       No address on file        Equal Access to Services     [place] SAAVEDRA : Hadii pollo rochao Sochanoali, waaxda luqadaha, qaybta kaalmada adeegyada, anh mcknight . So [facility] [phone].    ATENCIÓN: Si habla español, tiene a murillo disposición servicios gratuitos de asistencia lingüística. Llame al [phone].    We comply with applicable federal civil rights laws and [place] laws. We do not discriminate on the basis of race, color, national origin, age, disability, sex, sexual " orientation, or gender identity.            Thank you!     Thank you for choosing [facility]  for your care. Our goal is always to provide you with excellent care. Hearing back from our patients is one way we can continue to improve our services. Please take a few minutes to complete the written survey that you may receive in the mail after your visit with us. Thank you!             Your Updated Medication List - Protect others around you: Learn how to safely use, store and throw away your medicines at www.disposemymeds.org.          This list is accurate as of 12/6/18  8:21 AM.  Always use your most recent med list.                   Brand Name Dispense Instructions for use Diagnosis    ADDERALL 20 MG tablet   Generic drug:  amphetamine-dextroamphetamine      Take 20 mg by mouth 2 times daily        CLARITIN-D 12 HOUR 5-120 MG 12 hr tablet   Generic drug:  loratadine-pseudoePHEDrine      Take 1 tablet by mouth 2 times daily        melatonin 0.5 mg Tabs half-tab      Take 1 mg by mouth nightly as needed for sleep

## 2018-12-06 NOTE — PROGRESS NOTES
[address] [id number] [phone]  Dept: [phone]    PRE-OP EVALUATION:  Today's date: 2018    Boris Berumen (: 1985) presents for pre-operative evaluation assessment as requested by Dr. Santos.  He requires evaluation and anesthesia risk assessment prior to undergoing surgery/procedure for treatment of right knee .    Fax number for surgical facility: [phone]  Primary Physician: No Ref-Primary, Physician  Type of Anesthesia Anticipated: to be determined    Patient has a Health Care Directive or Living Will:  NO    Preop Questions 2018   Who is doing your surgery? dr sam davila   What are you having done? mpfl (medial patellofemoral ligament) reconstruction right knee   Date of Surgery/Procedure: 18   Facility or Hospital where procedure/surgery will be performed: [facility]    1.  Do you have a history of Heart attack, stroke, stent, coronary bypass surgery, or other heart surgery? No   2.  Do you ever have any pain or discomfort in your chest? No   3.  Do you have a history of  Heart Failure? No   4.   Are you troubled by shortness of breath when:  walking on a level surface, or up a slight hill, or at night? No   5.  Do you currently have a cold, bronchitis or other respiratory infection? No   6.  Do you have a cough, shortness of breath, or wheezing? No   7.  Do you sometimes get pains in the calves of your legs when you walk? No   8. Do you or anyone in your family have previous history of blood clots? No   9.  Do you or does anyone in your family have a serious bleeding problem such as prolonged bleeding following surgeries or cuts? No   10. Have you ever had problems with anemia or been told to take iron pills? No   11. Have you had any abnormal blood loss such as black, tarry or bloody stools? No   12. Have you ever had a blood transfusion? No   13. Have you or any of your relatives ever had problems with  anesthesia? No   14. Do you have sleep apnea, excessive snoring or daytime drowsiness? YES - snores, never had sleep study   15. Do you have any prosthetic heart valves? No   16. Do you have prosthetic joints? No         HPI:     HPI related to upcoming procedure: Since 9/2018 he has had instability of right patella that causes him pain.  He had done Physical Therapy.      See problem list for active medical problems.  Problems all longstanding and stable, except as noted/documented.  See ROS for pertinent symptoms related to these conditions.                                                                                                                                                          .    MEDICAL HISTORY:     Patient Active Problem List    Diagnosis Date Noted     Tobacco use disorder 12/06/2018     Priority: Medium     History of snoring 12/06/2018     Priority: Medium     Acute pain of right shoulder 09/30/2018     Priority: Medium     Acute pain of left knee 09/30/2018     Priority: Medium     Attention deficit hyperactivity disorder (ADHD), combined type 10/10/2017     Priority: Medium     CARDIOVASCULAR SCREENING; LDL GOAL LESS THAN 160 10/04/2017     Priority: Medium      No past medical history on file.  Past Surgical History:   Procedure Laterality Date     HC TOOTH EXTRACTION W/FORCEP      age 10     pyloric stenosis surgical repair as a baby       TONSILLECTOMY & ADENOIDECTOMY       Current Outpatient Prescriptions   Medication Sig Dispense Refill     amphetamine-dextroamphetamine (ADDERALL) 20 MG per tablet Take 20 mg by mouth 2 times daily       loratadine-pseudoePHEDrine (CLARITIN-D 12 HOUR) 5-120 MG per 12 hr tablet Take 1 tablet by mouth 2 times daily       melatonin 0.5 mg TABS half-tab Take 1 mg by mouth nightly as needed for sleep       OTC products: None, except as noted above    Allergies   Allergen Reactions     Mold Cough, Other (See Comments), Itching and Shortness Of Breath      "Pollen Extract Cough, Itching and Other (See Comments)      Latex Allergy: NO    Social History   Substance Use Topics     Smoking status: Current Some Day Smoker     Packs/day: 0.50     Start date: 1/1/2001     Smokeless tobacco: Never Used     Alcohol use Yes      Comment: couple drinks per week socially     History   Drug Use No       REVIEW OF SYSTEMS:   Constitutional, neuro, ENT, endocrine, pulmonary, cardiac, gastrointestinal, genitourinary, musculoskeletal, integument and psychiatric systems are negative, except as otherwise noted.    EXAM:   /68 (BP Location: Right arm, Patient Position: Chair, Cuff Size: Adult Large)  Pulse 85  Temp 98.4  F (36.9  C) (Oral)  Resp 20  Ht 5' 9\" (1.753 m)  Wt 210 lb 9.6 oz (95.5 kg)  SpO2 100%  BMI 31.1 kg/m2    GENERAL APPEARANCE: healthy, alert, no distress and over weight     EYES: EOMI,  PERRL     HENT: ear canals and TM's normal and nose and mouth without ulcers or lesions     NECK: no adenopathy, no asymmetry, masses, or scars and thyroid normal to palpation     RESP: lungs clear to auscultation - no rales, rhonchi or wheezes     CV: regular rates and rhythm, normal S1 S2, no S3 or S4 and no murmur, click or rub     ABDOMEN:  soft, nontender, no HSM or masses and bowel sounds normal     MS: extremities normal- no gross deformities noted, no evidence of inflammation in joints, FROM in all extremities.     SKIN: no suspicious lesions or rashes     NEURO: Normal strength and tone, sensory exam grossly normal, mentation intact and speech normal     PSYCH: mentation appears normal. and affect normal/bright     LYMPHATICS: No cervical adenopathy    DIAGNOSTICS:   EKG: appears normal, NSR, normal axis, normal intervals, no acute ST/T changes c/w ischemia, no LVH by voltage criteria, unchanged from previous tracings    IMPRESSION:   Reason for surgery/procedure: right patellar instability  Diagnosis/reason for consult: pre op exam    The proposed surgical procedure " is considered INTERMEDIATE risk.    REVISED CARDIAC RISK INDEX  The patient has the following serious cardiovascular risks for perioperative complications such as (MI, PE, VFib and 3  AV Block):  No serious cardiac risks  INTERPRETATION: 0 risks: Class I (very low risk - 0.4% complication rate)    The patient has the following additional risks for perioperative complications:  H/o snoring- possible undiagnosed sleep apnea      ICD-10-CM    1. Preop general physical exam Z01.818    2. Patellar instability of right knee M25.361    3. History of snoring Z87.898    4. Tobacco use disorder F17.200 EKG 12-lead complete w/read - Clinics       RECOMMENDATIONS:       Obstructive Sleep Apnea (or suspected sleep apnea)  Hospital staff are advised to monitor for sleep related oxygen desaturations due to suspicion of GUSTAVO      --Patient is to take all scheduled medications on the day of surgery EXCEPT for modifications listed below.    APPROVAL GIVEN to proceed with proposed procedure, without further diagnostic evaluation       Signed Electronically by: Verna Morel NP    Copy of this evaluation report is provided to requesting physician.    [facility] Preop Guidelines    Revised Cardiac Risk Index

## 2018-12-06 NOTE — PATIENT INSTRUCTIONS
Pre-operation Instructions:    - Stop Aspirin and NSAIDS (Ibuprofen, Motrin, Advil, Aleve, Naproxen, Naprosyn) 7 days prior to the surgery/procedure or follow surgeon's direction.    - Stop all Vitamins and Herbal Supplements (including Vitamin E, Fish Oil, Omega 3 Fatty Acids) 7 days prior to the surgery/procedure or follow surgeon's direction.    - Medications:  Continue your medications the morning of your surgery/procedure.    - If you become sick before your surgery/procedure (such as a fever, cough, congestion, or sore throat) you should call your primary care provider and surgeon.    - Unless given permission by your surgeon, do not eat or drink after midnight in the evening prior to your surgery/procedure.   Before Your Surgery      Call your surgeon if there is any change in your health. This includes signs of a cold or flu (such as a sore throat, runny nose, cough, rash or fever).    Do not smoke, drink alcohol or take over the counter medicine (unless your surgeon or primary care doctor tells you to) for the 24 hours before and after surgery.    If you take prescribed drugs: Follow your doctor s orders about which medicines to take and which to stop until after surgery.    Eating and drinking prior to surgery: follow the instructions from your surgeon    Take a shower or bath the night before surgery. Use the soap your surgeon gave you to gently clean your skin. If you do not have soap from your surgeon, use your regular soap. Do not shave or scrub the surgery site.  Wear clean pajamas and have clean sheets on your bed.     Learning to [place]      Coping is the key to successfully living a life without cigarettes.    You have unconsciously connected smoking with many behaviors and feeling that you experience every day of your life.  Engaging in that behavior or experiencing that feeling automatically triggers a desire for a cigarette. Unless you do something to prevent those urges from occurring and learn  to deal with the urges that do occur, you may be tempted back to smoking. Coping breaks all those connections and allows you to live a life free of cigarettes.  Coping does not mean that you have to completely stop living your life and join a monastery! It does mean that you must work at changing how you do many of the routines that prompt you to smoke. It also means changing how you think in those tempting situations.  These techniques are all simple and doable. But they are powerful. Research and practical experience has proven time and again that these techniques help to eliminate urges as well as give you the tools to deal with urges that manage to slip through.  Throughout the next few pages you will find literally hundreds of suggestions on how to deal with situations that trigger most smokers to smoke.  Think about the situations where you have been especially tempted to smoke in the past. Refer to the coping suggestions for each of those situations. Then, determine the best coping choices for you. These techniques will be your  weapons of choice  the next time you encounter that situation. It is important to pick one coping technique to change what you do and one to change how you think for each trigger. Combining techniques makes them even stronger and increases your ability to successfully cope.  Even though there are plenty of excellent coping suggestions here, these are by no means all the techniques that exist. So, if you have an idea that s not listed here don t be afraid to use it. Be creative!  Finally remember that no matter how many excellent ideas you come up with you must actually put them into practice. Work at this for at least six to eight weeks and you ll quickly learn to deal with any tempting situation that may come along!        Coping Menu    Preventing Urges    There are many things you can do before you get into a tempting situation to eliminate the urge to smoke.    Visualize yourself  comfortably dealing with the situation without a cigarette.    Plan ahead. Know what you will do in any given situation before you encounter it. Practice that plan often.    Avoid the situation until you feel you can deal with it.    Change the routines you associate with smoking as much as possible.    Rethink your belief that smoking somehow makes your life better or helps you deal with all your problems.    Begin an exercise program. If you can t do anything else just walk as briskly as you can every day for half an hour.    Keep yourself busy. Avoid boring situations where you may begin to think about smoking.    Remind yourself often that you are happy being a nonsmoker and that life is much better without cigarettes.  Coping with temptation  However, sometimes the urge manages to come through. You must be ready to cope with that urge as it s happening. The following suggestions will help you deal with that urge so you aren t tempted back to cigarettes.  General Suggestions    Deep Breathing. Every time an urge hits take in a slow deep breath, hold it for three to five seconds and then slowly exhale.    Drink a glass of water.    Talk about the urge. Call your support person or let people around you know you need to talk for a few minutes.    Escape the situation. Leave until you feel comfortable going back.    Picture a stop sign in your head or say the word loudly to yourself.    Count to twenty!    Say to yourself,  I am in control  or  I can get through this.     Just accept the though. It s natural that you will have thoughts about cigarettes once you quit. Don t make a big deal out of them. Say to yourself  So what  and let the thought go.    Specific Situations  After Meals    Get up from the table as soon as you are done eating    Brush your teeth after every meal    Always sit in the nonsmoking section of a restaurant    At home have dessert and coffee in a different place from dinner    Take a short  walk after each meal  Alcohol    Explore alternative ways to socialize with friends  o Go to a movie  o Work out together  o Have a party without alcohol    If you choose to drink  o Change what you usually drink  o Limit yourself to one or two drinks  o Talk about the urges when they occur  o Leave the bar periodically for fresh air (Do some deep breathing while outside).    Decide not to go to a bar for at least the first few weeks of your quit    Remind yourself that you can have fun without drinking. Millions of people do it all the time!  Boredom    Always carry a book/newspaper/crossword puzzle with you    Plan ahead so that you will not have long periods of inactivity    Learn to enjoy doing nothing from time to time. You do not always have to be doing something important    Use idle time to make the grocery list, plan your schedule or write letters    Start a new hobby or begin an exercise program to fill the time.  Breaks    Take your break at a different time    Change the place where you take your break    Take a short walk instead of staying indoors    Do a crossword puzzle or read a novel    Realize that you don t need an excuse to take a break. You deserve it!      Car    Choose a slightly different route for routine trips    Remove the ashtray from the car    Listen to a talk radio station or books on tape to keep your mind occupied    Use public transportation for the first few weeks after you quit    Change the environment in the car. Clean the entire interior; get new seat covers, put up a no smoking sign, etc.  Coffee    Drink a flavored coffee or a different brand    Drink coffee out of a glass, paper cup, or the good china you never use    Change where you have your coffee breaks at work    If you always have your morning coffee at home have it at a café or at work    Drink tea instead of coffee  Evenings    Find projects to do while at home. Clean out the basement, refinish furniture,  etc.    Keep yourself occupied while watching TV. Do puzzles, make out the grocery list, read a magazine    Visit family or friends instead of staying at home    Begin a new hobby or volunteer at a worthwhile organization    Start an exercise program. If you can t do anything else, take a brisk half hour walk each night  Hand/Mouth    Use cinnamon sticks (the kind used for cider)    Suck on sugar free candy    Use straws/swizzle sticks/tooth picks    Chew strong tangy sugar free gum    Eat carrots or celery sticks  Living with another smoker    Negotiate with the other smoker about where and when he/she will smoke. Do not make demands    Have the other smoker keep his/her cigarettes where you will not be able to find them    Practice saying  No thank you, I don t  smoke   just in case someone offers you a cigarette    Don t drink alcohol or limit yourself to one or two drinks    Have a support person with you at the party    Stress Management    Separate cigarettes from the situation. Realize that smoking never made a situation any better or helped you deal with it.    Step back, take a deep breath, and say to yourself,  I can handle this.  Then deal with the problem.    Strategize about how to handle stressful situations with friends, relatives or trusted clergy before encountering those situations.    Realize that every problem has a solution that does not involve smoking.    Begin an exercise program, take a formal stress management class or learn to meditate.   Telephone    Stand instead of sit    Move the location of the phone    If you don t already have one, get a portable phone or a cell phone    Hold the phone in the hand opposite of the one you usually use    Limit your time on the phone (use email instead)!  Thoughts about smoking    Just because you think about something does not mean you have to do it. Remember, if you did everything you ever thought about you would be in [facility] right now!!    Don t focus  on the thought. Distract yourself:  o Say to yourself  I am in control  and let the thought go  o Remind yourself of the benefits of quitting  o Think of the reason you quit. Focus on that  o Say the word stop or picture a stop sign    Accept the thoughts. You naturally will be thinking about cigarettes for a while after you quit. Say to yourself,  So what  and move on    See yourself in your mind s eye as a successful nonsmoker. Practice seeing yourself in all kinds of situations dealing effectively without smoking    Give the smoker one ashtray. They will keep this ashtray clean and out of your sight when not in use    Determine a reasonable length of time for these changes    Surprise the smoker with a special dinner or gift at the end of your first month of quitting as a thank you for their cooperation.  Morning Routine    Change the order of your routine    Jump into the shower as soon as you get up    Eat something for breakfast if you normally do not    If you listen to the radio turn on the TV or vice versa    Look into the mirror first thing each morning and say,  I m proud to be a nonsmoker!   Negative Moods    Rethink your belief that cigarettes will calm or relax you    Ask yourself how a cigarette will make the situation any better    Do deep breathing throughout the day    As you do the deep breathing, think calming thoughts. Say to yourself,  I can get through this  or simply  I am calm.     Realize that smoking does not hurt anyone but yourself. Smoking is not a good way to  get back  at anyone or to punish someone you are angry with  Other Smokers    Avoid places where you know people are smoking for the first few weeks of your quit    Leave the scene from time to time if you have to be in a smoking environment    Politely explain to the person that you are trying to quit and ask them not to smoke around you    Ask yourself what is still appealing about seeing other people smoke    Realize that the  smoker is not happier or having more fun than you are just because they are smoking  Parties/Socializing    Before you go develop and practice a plan to deal with situations    Rehearse going to the function. Close your eyes and see yourself having a good time, meeting people, and enjoying the music all without a cigarette  Weight Gain    Do not diet.  Attempting two major behavior changes at the same time usually leads to failure at both. Wait at least two or three months after quitting before tackling any weight loss program.    Remember, the average weight gain, as a direct result of quitting, is only five to seven pounds. Any weight gain over and above that is due to behavioral changes on the part of the quitter    Drink six to eight glasses of water a day    Begin a modest exercise program. If you can do nothing else, take a brisk half hour walk every day    Remember, smoking does not turn your body into a fat burning machine. If it did, every smoker would be about 100 pounds!!!        Work    Rearrange your office or work space if you can    Put a  No Smoking  sign or motivation poster in your work area    Change your work routine as much as possible    Listen to music, talk radio, or tapes    Have a support person at work

## 2018-12-07 ENCOUNTER — TELEPHONE (OUTPATIENT)
Dept: FAMILY MEDICINE | Facility: CLINIC | Age: 33
End: 2018-12-07

## 2018-12-27 ENCOUNTER — THERAPY VISIT (OUTPATIENT)
Dept: PHYSICAL THERAPY | Facility: CLINIC | Age: 33
End: 2018-12-27
Payer: COMMERCIAL

## 2018-12-27 DIAGNOSIS — M25.561 ACUTE PAIN OF RIGHT KNEE: Primary | ICD-10-CM

## 2018-12-27 PROCEDURE — 97112 NEUROMUSCULAR REEDUCATION: CPT | Mod: GP | Performed by: PHYSICAL THERAPIST

## 2018-12-27 PROCEDURE — 97161 PT EVAL LOW COMPLEX 20 MIN: CPT | Mod: GP | Performed by: PHYSICAL THERAPIST

## 2018-12-27 PROCEDURE — 97110 THERAPEUTIC EXERCISES: CPT | Mod: GP | Performed by: PHYSICAL THERAPIST

## 2018-12-27 ASSESSMENT — ACTIVITIES OF DAILY LIVING (ADL)
RISE FROM A CHAIR: ACTIVITY IS MINIMALLY DIFFICULT
STAND: ACTIVITY IS MINIMALLY DIFFICULT
SQUAT: I AM UNABLE TO DO THE ACTIVITY
STIFFNESS: THE SYMPTOM AFFECTS MY ACTIVITY SEVERELY
SIT WITH YOUR KNEE BENT: I AM UNABLE TO DO THE ACTIVITY
SWELLING: THE SYMPTOM AFFECTS MY ACTIVITY MODERATELY
GIVING WAY, BUCKLING OR SHIFTING OF KNEE: THE SYMPTOM AFFECTS MY ACTIVITY SEVERELY
KNEE_ACTIVITY_OF_DAILY_LIVING_SCORE: 40
RAW_SCORE: 28
LIMPING: THE SYMPTOM AFFECTS MY ACTIVITY SEVERELY
PAIN: THE SYMPTOM AFFECTS MY ACTIVITY SLIGHTLY
KNEE_ACTIVITY_OF_DAILY_LIVING_SUM: 28
AS_A_RESULT_OF_YOUR_KNEE_INJURY,_HOW_WOULD_YOU_RATE_YOUR_CURRENT_LEVEL_OF_DAILY_ACTIVITY?: ABNORMAL
WALK: ACTIVITY IS MINIMALLY DIFFICULT
HOW_WOULD_YOU_RATE_THE_CURRENT_FUNCTION_OF_YOUR_KNEE_DURING_YOUR_USUAL_DAILY_ACTIVITIES_ON_A_SCALE_FROM_0_TO_100_WITH_100_BEING_YOUR_LEVEL_OF_KNEE_FUNCTION_PRIOR_TO_YOUR_INJURY_AND_0_BEING_THE_INABILITY_TO_PERFORM_ANY_OF_YOUR_USUAL_DAILY_ACTIVITIES?: 30
HOW_WOULD_YOU_RATE_THE_OVERALL_FUNCTION_OF_YOUR_KNEE_DURING_YOUR_USUAL_DAILY_ACTIVITIES?: SEVERELY ABNORMAL
GO DOWN STAIRS: ACTIVITY IS MINIMALLY DIFFICULT
GO UP STAIRS: ACTIVITY IS MINIMALLY DIFFICULT
KNEEL ON THE FRONT OF YOUR KNEE: I AM UNABLE TO DO THE ACTIVITY
WEAKNESS: THE SYMPTOM PREVENTS ME FROM ALL DAILY ACTIVITIES

## 2018-12-27 NOTE — LETTER
[facility]  [address]  [place] [address]  [phone]    2018    Re: Boris Berumen   :   1985  MRN:  [id number]   REFERRING PHYSICIAN:   [facility]    Date of Initial Evaluation:  18  Visits:  Rxs Used: 1  Reason for Referral:  Acute pain of right knee    [facility] Initial Evaluation    Subjective:  The history is provided by the patient. No  was used.   Knee Activity of Daily Living Score: 40            Objective:  Standing Alignment:    Cervical/Thoracic:  Forward head  Shoulder/UE:  Rounded shoulders    Knee:  Genu valgus R and genu valgus L    Gait:    Gait Type:  Antalgic   Weight Bearing Status:  WBAT   Assistive Devices:  Brace and crutches  Neurological: He is alert. He has normal strength and normal reflexes. No cranial nerve deficit or sensory deficit.     Knee Evaluation:  ROM:  Strength wnl knee: Weak SLR control. Quad Set 3/5.     PROM  Hyperextension: Left:   Right:  0  Extension: Left:   Right:  0  Flexion: Left:   Right:  65    Ligament Testing:  Not Assessed    Special Tests: Not Assessed    Palpation:  Palpation of knee: clean and healing well. Ballotable swelling global knee .   Right knee tenderness present at:  Incisional    Assessment/Plan:    Patient is a 33 year old male with right side knee complaints.    Patient has the following significant findings with corresponding treatment plan.                Diagnosis 1:  R knee MPFL , post op 18  Pain -  hot/cold therapy, self management, education, directional preference exercise and home program  Decreased ROM/flexibility - manual therapy and therapeutic exercise  Decreased strength - therapeutic exercise and therapeutic activities  Impaired gait - gait training    Therapy Evaluation Codes:   1) History comprised of:   Personal factors that impact the plan of care:      MPFL Reconstruction Rehabilitation  Protocol.    Comorbidity factors that impact the plan of care are:      Overweight and Smoking.     Medications impacting care: Pain.  2) Examination of Body Systems comprised of:   Body structures and functions that impact the plan of care:      Knee.   Activity limitations that impact the plan of care are:      Driving, Dressing, Lifting, Sitting, Sports, Squatting/kneeling, Stairs, Standing, Walking and Sleeping.  3) Clinical presentation characteristics are:   Stable/Uncomplicated.  4) Decision-Making    Low complexity using standardized patient assessment instrument and/or measureable assessment of functional outcome.    Cumulative Therapy Evaluation is: Low complexity.  Previous and current functional limitations:  (See Goal Flow Sheet for this information)    Short term and Long term goals: (See Goal Flow Sheet for this information)   Communication ability:  Patient appears to be able to clearly communicate and understand verbal and written communication and follow directions correctly.  Treatment Explanation - The following has been discussed with the patient:   RX ordered/plan of care  Anticipated outcomes  Possible risks and side effects  This patient would benefit from PT intervention to resume normal activities.   Rehab potential is good.    Frequency:  1 X week, once daily  Duration:  for 6 weeks  Discharge Plan:  Achieve all LTG.  Independent in home treatment program.  Reach maximal therapeutic benefit.    Thank you for your referral.    INQUIRIES  Therapist: Elvis Quinn, PT, ATC, Cert. [facility]  [address] [place] [address]  Phone: [phone]  Fax: [phone]

## 2018-12-30 NOTE — PROGRESS NOTES
[facility] Initial Evaluation  Subjective:  The history is provided by the patient. No  was used.          Knee Activity of Daily Living Score: 40            Objective:  Standing Alignment:    Cervical/Thoracic:  Forward head  Shoulder/UE:  Rounded shoulders        Knee:  Genu valgus R and genu valgus L      Gait:    Gait Type:  Antalgic   Weight Bearing Status:  WBAT   Assistive Devices:  Brace and crutches        Neurological: He is alert. He has normal strength and normal reflexes. No cranial nerve deficit or sensory deficit.                                                   Knee Evaluation:  ROM:  Strength wnl knee: Weak SLR control. Quad Set 3/5.     PROM    Hyperextension: Left:   Right:  0  Extension: Left:   Right:  0  Flexion: Left:   Right:  65        Ligament Testing:  Not Assessed                Special Tests: Not Assessed      Palpation:  Palpation of knee: clean and healing well. Ballotable swelling global knee .     Right knee tenderness present at:  Incisional            General     ROS    Assessment/Plan:    Patient is a 33 year old male with right side knee complaints.    Patient has the following significant findings with corresponding treatment plan.                Diagnosis 1:  R knee MPFL , post op 12/13/18  Pain -  hot/cold therapy, self management, education, directional preference exercise and home program  Decreased ROM/flexibility - manual therapy and therapeutic exercise  Decreased strength - therapeutic exercise and therapeutic activities  Impaired gait - gait training    Therapy Evaluation Codes:   1) History comprised of:   Personal factors that impact the plan of care:      MPFL Reconstruction Rehabilitation Protocol.    Comorbidity factors that impact the plan of care are:      Overweight and Smoking.     Medications impacting care: Pain.  2) Examination of Body Systems comprised of:   Body structures and functions that impact the plan of  care:      Knee.   Activity limitations that impact the plan of care are:      Driving, Dressing, Lifting, Sitting, Sports, Squatting/kneeling, Stairs, Standing, Walking and Sleeping.  3) Clinical presentation characteristics are:   Stable/Uncomplicated.  4) Decision-Making    Low complexity using standardized patient assessment instrument and/or measureable assessment of functional outcome.  Cumulative Therapy Evaluation is: Low complexity.    Previous and current functional limitations:  (See Goal Flow Sheet for this information)    Short term and Long term goals: (See Goal Flow Sheet for this information)     Communication ability:  Patient appears to be able to clearly communicate and understand verbal and written communication and follow directions correctly.  Treatment Explanation - The following has been discussed with the patient:   RX ordered/plan of care  Anticipated outcomes  Possible risks and side effects  This patient would benefit from PT intervention to resume normal activities.   Rehab potential is good.    Frequency:  1 X week, once daily  Duration:  for 6 weeks  Discharge Plan:  Achieve all LTG.  Independent in home treatment program.  Reach maximal therapeutic benefit.    Please refer to the daily flowsheet for treatment today, total treatment time and time spent performing 1:1 timed codes.

## 2019-01-03 ENCOUNTER — THERAPY VISIT (OUTPATIENT)
Dept: PHYSICAL THERAPY | Facility: CLINIC | Age: 34
End: 2019-01-03
Payer: COMMERCIAL

## 2019-01-03 DIAGNOSIS — M25.561 ACUTE PAIN OF RIGHT KNEE: Primary | ICD-10-CM

## 2019-01-03 PROCEDURE — 97110 THERAPEUTIC EXERCISES: CPT | Mod: GP | Performed by: PHYSICAL THERAPIST

## 2019-01-03 PROCEDURE — 97112 NEUROMUSCULAR REEDUCATION: CPT | Mod: GP | Performed by: PHYSICAL THERAPIST

## 2019-01-15 ENCOUNTER — THERAPY VISIT (OUTPATIENT)
Dept: PHYSICAL THERAPY | Facility: CLINIC | Age: 34
End: 2019-01-15
Payer: COMMERCIAL

## 2019-01-15 DIAGNOSIS — M25.561 ACUTE PAIN OF RIGHT KNEE: Primary | ICD-10-CM

## 2019-01-15 PROCEDURE — 97112 NEUROMUSCULAR REEDUCATION: CPT | Mod: GP | Performed by: PHYSICAL THERAPIST

## 2019-01-15 PROCEDURE — 97110 THERAPEUTIC EXERCISES: CPT | Mod: GP | Performed by: PHYSICAL THERAPIST

## 2019-01-15 NOTE — LETTER
[facility]  [address]  [place] [address]  [phone]    2019    Re: Boris Berumen   :   1985  MRN:  [id number]   REFERRING PHYSICIAN:   [facility]    Date of Initial Evaluation:  18  Visits:  Rxs Used: 3  Reason for Referral:  Acute pain of right knee    PROGRESS  REPORT  Progress reporting period is from 18 to 1/15/18.     SUBJECTIVE  Subjective: Boris is roughly 1 month post op R knee MPFL. On balance, he is near painfree, gradually increasing strength and mobility. His incisions are clean and healing well. He is slightly more irritated to the medial incision along with local swelling that seems to be new for him.    Current Pain level: 10   Initial Pain level: 5/10   Changes in function: Yes, see goal flow sheet for change in function     OBJECTIVE  Knee  -110 assisted flexion ROM , full extension, good quad set contraction.   Patient is able to SLR without lag.     Noted limp yet during gait. Continues to use hinged brace     Plan to gradually increase kayy brace as well as stay the course with post op care progression to proximal hip strength, transition to CKC strength, core  strength and full smooth gait within the next 4 weeks.   Follow up appointment pklanned with his MD tomorrow for consult. Special directive for MD to consult about medial incision swelling.       ASSESSMENT/PLAN  Updated problem list and treatment plan: Diagnosis 1:   R knee MPFL     STG/LTGs have been met or progress has been made towards goals:  Yes (See Goal flow sheet completed today.)  Assessment of Progress: The patient's condition is improving.  The patient's condition has potential to improve.  Self Management Plans:  Patient has been instructed in a home treatment program.    Recommendations:  This patient would benefit from continued therapy.     Frequency:  1 X week,  To every other once daily  Duration:  for 6  weeks    Follow up with Dr. Santos 1/16/19    Thank you for your referral.    INQUIRIES  Therapist:Elvis Quinn, PT, ATC, Cert. MDT  [facility]  [address]  [place] [address]  Phone: [phone]  Fax: [phone]

## 2019-01-16 NOTE — PROGRESS NOTES
Subjective:  HPI                    Objective:  System    Physical Exam    General     ROS    Assessment/Plan:    PROGRESS  REPORT    Progress reporting period is from 12/30/18 to 1/15/18.     SUBJECTIVE  Subjective: Boris is roughly 1 month post op R knee MPFL. On balance, he is near painfree, gradually increasing strength and mobility. His incisions are clean and healing well. He is slightly more irritated to the medial incision along with local swelling that seems to be new for him.    Current Pain level: 1/10   Initial Pain level: 5/10   Changes in function: Yes, see goal flow sheet for change in function    ;   ,         OBJECTIVE  Knee  -110 assisted flexion ROM , full extension, good quad set contraction.     Patient is able to SLR without lag.     Noted limp yet during gait. Continues to use hinged brace     Plan to gradually increase kayy brace as well as stay the course with post op care progression to proximal hip strength, transition to CKC strength, core  strength and full smooth gait within the next 4 weeks.     Follow up appointment pklanned with his MD tomorrow for consult. Special directive for MD to consult about medial incision swelling.       ASSESSMENT/PLAN  Updated problem list and treatment plan: Diagnosis 1:   R knee MPFL     STG/LTGs have been met or progress has been made towards goals:  Yes (See Goal flow sheet completed today.)  Assessment of Progress: The patient's condition is improving.  The patient's condition has potential to improve.    Self Management Plans:  Patient has been instructed in a home treatment program.      Recommendations:  This patient would benefit from continued therapy.     Frequency:  1 X week,  To every other once daily  Duration:  for 6 weeks    Follow up with Dr. Santos 1/16/19        Please refer to the daily flowsheet for treatment today, total treatment time and time spent performing 1:1 timed codes.

## 2019-02-01 ENCOUNTER — THERAPY VISIT (OUTPATIENT)
Dept: PHYSICAL THERAPY | Facility: CLINIC | Age: 34
End: 2019-02-01
Payer: COMMERCIAL

## 2019-02-01 DIAGNOSIS — M25.561 ACUTE PAIN OF RIGHT KNEE: Primary | ICD-10-CM

## 2019-02-01 DIAGNOSIS — Z98.890 S/P RECONSTRUCTION OF LIGAMENT OF KNEE JOINT: ICD-10-CM

## 2019-02-01 PROCEDURE — 97110 THERAPEUTIC EXERCISES: CPT | Mod: GP | Performed by: PHYSICAL THERAPIST

## 2019-02-01 PROCEDURE — 97112 NEUROMUSCULAR REEDUCATION: CPT | Mod: GP | Performed by: PHYSICAL THERAPIST

## 2019-02-01 NOTE — LETTER
[facility]   [address]  [place] [id number] [phone]    2019    Re: Boris Berumen   :   1985  MRN:  [id number]   REFERRING PHYSICIAN:   [facility]    Date of Initial Evaluation:  18  Visits:  Rxs Used: 4  Reason for Referral:     Acute pain of right knee  S/P reconstruction of ligament of knee joint    PROGRESS  REPORT  Progress reporting period is from 18 to 19.     SUBJECTIVE   Boris is 6-7 weeks post op MPFL Reconstruction. Patient is FWB without a limp. Effusion resolved. Full Knee extension  and ROM goal met to 120-130. Patient is returning to normal stair reciprocation.   Next step is advancing [facility] drill exerises avoid functional valgus.    Initial Pain level: 5/10   Changes in function: Yes, see goal flow sheet for change in function     OBJECTIVE  Assisted ROM 0-130. very good quad set. Excellent functional control of knee valgum with eccentric load.   Continue large muscle group strengthening, proprioception .   Goal over next 2-4 weeks is full stair reciproation, ROM full by week 12       ASSESSMENT/PLAN  Updated problem list and treatment plan: Diagnosis 1:  R knee MPFL Reconstruction     Assessment of Progress: The patient's condition is improving.  The patient's condition has potential to improve.  Self Management Plans:  Patient has been instructed in a home treatment program.    Recommendations:  This patient would benefit from continued therapy.     Frequency:  1 X week, to every other week once daily  Duration:  for 12 weeks    Rehab Plans: continue to advance Phase III MPFL program eder the next month and re-assess functional gains at that point as well as MD follow up.     Thank you for your referral.    INQUIRIES  Therapist: Elvis Quinn, PT, ATC, Cert. MDT  [facility]   [address]  [place] [address]  Phone: [phone]  Fax: [phone]

## 2019-02-04 NOTE — PROGRESS NOTES
Subjective:  HPI                    Objective:  System    Physical Exam    General     ROS    Assessment/Plan:    PROGRESS  REPORT    Progress reporting period is from 12/27/18 to 2/1/19.     SUBJECTIVE   Boris is 6-7 weeks post op MPFL Reconstruction. Patient is FWB without a limp. Effusion resolved. Full Knee extension  and ROM goal met to 120-130. Patient is returning to normal stair reciprocation.     Next step is advancing CKC drill exerises avoid functional valgus.        Initial Pain level: 5/10   Changes in function: Yes, see goal flow sheet for change in function    ;   ,         OBJECTIVE  : Assisted ROM 0-130. very good quad set. Excellent functional control of knee valgum with eccentric load.     Continue large muscle group strengthening, proprioception .     Goal over next 2-4 weeks is full stair reciproation, ROM full by week 12       ASSESSMENT/PLAN  Updated problem list and treatment plan: Diagnosis 1:  R knee MPFL Reconstruction       Assessment of Progress: The patient's condition is improving.  The patient's condition has potential to improve.  Self Management Plans:  Patient has been instructed in a home treatment program.  I      Recommendations:  This patient would benefit from continued therapy.     Frequency:  1 X week, to every other week once daily  Duration:  for 12 weeks    Rehab Plans: continue to advance Phase III MPFL program eder the next month and re-assess functional gains at that point as well as MD follow up.     Please refer to the daily flowsheet for treatment today, total treatment time and time spent performing 1:1 timed codes.

## 2019-02-13 ENCOUNTER — OFFICE VISIT (OUTPATIENT)
Dept: FAMILY MEDICINE | Facility: CLINIC | Age: 34
End: 2019-02-13
Payer: COMMERCIAL

## 2019-02-13 VITALS
HEART RATE: 96 BPM | RESPIRATION RATE: 22 BRPM | TEMPERATURE: 98.5 F | WEIGHT: 212 LBS | BODY MASS INDEX: 31.4 KG/M2 | SYSTOLIC BLOOD PRESSURE: 130 MMHG | DIASTOLIC BLOOD PRESSURE: 80 MMHG | HEIGHT: 69 IN | OXYGEN SATURATION: 98 %

## 2019-02-13 DIAGNOSIS — S43.431D TEAR OF RIGHT GLENOID LABRUM, SUBSEQUENT ENCOUNTER: ICD-10-CM

## 2019-02-13 DIAGNOSIS — Z01.818 PREOP GENERAL PHYSICAL EXAM: Primary | ICD-10-CM

## 2019-02-13 DIAGNOSIS — F90.2 ATTENTION DEFICIT HYPERACTIVITY DISORDER (ADHD), COMBINED TYPE: ICD-10-CM

## 2019-02-13 DIAGNOSIS — E78.5 HYPERLIPIDEMIA LDL GOAL <130: ICD-10-CM

## 2019-02-13 LAB
ANION GAP SERPL CALCULATED.3IONS-SCNC: 5 MMOL/L (ref 3–14)
BUN SERPL-MCNC: 13 MG/DL (ref 7–30)
CALCIUM SERPL-MCNC: 9.5 MG/DL (ref 8.5–10.1)
CHLORIDE SERPL-SCNC: 104 MMOL/L (ref 94–109)
CHOLEST SERPL-MCNC: 195 MG/DL
CO2 SERPL-SCNC: 29 MMOL/L (ref 20–32)
CREAT SERPL-MCNC: 1.04 MG/DL (ref 0.66–1.25)
GFR SERPL CREATININE-BSD FRML MDRD: >90 ML/MIN/{1.73_M2}
GLUCOSE SERPL-MCNC: 94 MG/DL (ref 70–99)
HDLC SERPL-MCNC: 39 MG/DL
HGB BLD-MCNC: 14.4 G/DL (ref 13.3–17.7)
LDLC SERPL CALC-MCNC: 116 MG/DL
NONHDLC SERPL-MCNC: 156 MG/DL
POTASSIUM SERPL-SCNC: 4.8 MMOL/L (ref 3.4–5.3)
SODIUM SERPL-SCNC: 138 MMOL/L (ref 133–144)
TRIGL SERPL-MCNC: 201 MG/DL

## 2019-02-13 PROCEDURE — 80048 BASIC METABOLIC PNL TOTAL CA: CPT | Performed by: FAMILY MEDICINE

## 2019-02-13 PROCEDURE — 99214 OFFICE O/P EST MOD 30 MIN: CPT | Performed by: FAMILY MEDICINE

## 2019-02-13 PROCEDURE — 85018 HEMOGLOBIN: CPT | Performed by: FAMILY MEDICINE

## 2019-02-13 PROCEDURE — 36415 COLL VENOUS BLD VENIPUNCTURE: CPT | Performed by: FAMILY MEDICINE

## 2019-02-13 PROCEDURE — 80061 LIPID PANEL: CPT | Performed by: FAMILY MEDICINE

## 2019-02-13 RX ORDER — IBUPROFEN 200 MG
600 TABLET ORAL EVERY 8 HOURS PRN
COMMUNITY
End: 2020-11-18

## 2019-02-13 ASSESSMENT — PAIN SCALES - GENERAL: PAINLEVEL: NO PAIN (0)

## 2019-02-13 ASSESSMENT — MIFFLIN-ST. JEOR: SCORE: 1887.01

## 2019-02-13 NOTE — LETTER
"[address] [address]  [phone]                                                                                                February 18, 2019    Boris Berumen  [address] [facility] [address]        Dear Mr. Berumen,      The results of your recent lab tests were overall within normal limits. The cholesterol is slightly elevated but not at a level that requires medications.    Lipid goals:    Cholesterol: Desirable is less than 200, Borderline is 200-240  HDL (Good Cholesterol): Desirable is greater than 40  LDL (Bad Cholesterol): Desirable is less than 130, Borderline 130-160  Triglycerides: Desirable is less than 150,  Borderline is 150-300    Ways to improve your cholesterol...    1- Eats less saturated fats (including avoiding \"trans\" fats).    2 - Eat more unsaturated fats  - found in vegetables, grains, and tree nuts.  Also by replacing butter with canola oil or olive oil.    3 - Eat more nuts.  1-2 ounces (a small handful) of almonds, walnuts, hazelnuts or pecans once a day in place of other less healthy snacks.    4 - Eat more high fiber foods - vegetables and whole grains including oat bran, oats, beans, peas, and flax seed.    5 - Eat more fish - such as salmon, tuna, mackerel, and sardines.  1 or 2 six ounce servings per week is a healthy replacement for other proteins.    6 - Exercise for at least 120 minutes per week - which is equal to 30 minutes 4 days per week.       Enclosed is a copy of these results.  If you have any further questions or problems, please contact our office.  Results for orders placed or performed in visit on 02/13/19   Basic metabolic panel   Result Value Ref Range    Sodium 138 133 - 144 mmol/L    Potassium 4.8 3.4 - 5.3 mmol/L    Chloride 104 94 - 109 mmol/L    Carbon Dioxide 29 20 - 32 mmol/L    Anion Gap 5 3 - 14 mmol/L    Glucose 94 70 - 99 mg/dL    Urea Nitrogen 13 7 - 30 mg/dL    Creatinine 1.04 " 0.66 - 1.25 mg/dL    GFR Estimate >90 >60 mL/min/[1.73_m2]    GFR Estimate If Black >90 >60 mL/min/[1.73_m2]    Calcium 9.5 8.5 - 10.1 mg/dL   Hemoglobin   Result Value Ref Range    Hemoglobin 14.4 13.3 - 17.7 g/dL   Lipid panel reflex to direct LDL Fasting   Result Value Ref Range    Cholesterol 195 <200 mg/dL    Triglycerides 201 (H) <150 mg/dL    HDL Cholesterol 39 (L) >39 mg/dL    LDL Cholesterol Calculated 116 (H) <100 mg/dL    Non HDL Cholesterol 156 (H) <130 mg/dL         Sincerely,      Robert Elias MD/mv

## 2019-02-13 NOTE — PROGRESS NOTES
[address] [id number] [phone]  Dept: [phone]    PRE-OP EVALUATION:  Today's date: 2019    Boris Berumen (: 1985) presents for pre-operative evaluation assessment as requested by Dr. Rizwana Santos.  He requires evaluation and anesthesia risk assessment prior to undergoing surgery/procedure for treatment of right shoulder  .    Fax number for surgical facility: [phone]  Primary Physician: No Ref-Primary, Physician  Type of Anesthesia Anticipated: to be determined    Patient has a Health Care Directive or Living Will:  NO    Preop Questions 2019   Who is doing your surgery? Dr Rizwana Santos   What are you having done? repair torn labrum right shoulder   Date of Surgery/Procedure: 19   Facility or Hospital where procedure/surgery will be performed: [facility]   1.  Do you have a history of Heart attack, stroke, stent, coronary bypass surgery, or other heart surgery? No   2.  Do you ever have any pain or discomfort in your chest? No   3.  Do you have a history of  Heart Failure? No   4.   Are you troubled by shortness of breath when:  walking on a level surface, or up a slight hill, or at night? No   5.  Do you currently have a cold, bronchitis or other respiratory infection? No   6.  Do you have a cough, shortness of breath, or wheezing? No   7.  Do you sometimes get pains in the calves of your legs when you walk? No   8. Do you or anyone in your family have previous history of blood clots? No   9.  Do you or does anyone in your family have a serious bleeding problem such as prolonged bleeding following surgeries or cuts? No   10. Have you ever had problems with anemia or been told to take iron pills? No   11. Have you had any abnormal blood loss such as black, tarry or bloody stools? No   12. Have you ever had a blood transfusion? No   13. Have you or any of your relatives ever had problems with anesthesia? No    14. Do you have sleep apnea, excessive snoring or daytime drowsiness? UNKNOWN - snore, told I gets ;loud      15. Do you have any prosthetic heart valves? No   16. Do you have prosthetic joints? No         HPI:     HPI related to upcoming procedure: Glenoid Tear right shoulder      See problem list for active medical problems.  Problems all longstanding and stable, except as noted/documented.  See ROS for pertinent symptoms related to these conditions.                                                                                                        Patient also fasting today and will get baseline  labs                                                    .    MEDICAL HISTORY:     Patient Active Problem List    Diagnosis Date Noted     Tobacco use disorder 12/06/2018     Priority: Medium     History of snoring 12/06/2018     Priority: Medium     Acute pain of right shoulder 09/30/2018     Priority: Medium     Acute pain of left knee 09/30/2018     Priority: Medium     Attention deficit hyperactivity disorder (ADHD), combined type 10/10/2017     Priority: Medium     CARDIOVASCULAR SCREENING; LDL GOAL LESS THAN 160 10/04/2017     Priority: Medium      No past medical history on file.  Past Surgical History:   Procedure Laterality Date     HC TOOTH EXTRACTION W/FORCEP      age 10     pyloric stenosis surgical repair as a baby       TONSILLECTOMY & ADENOIDECTOMY       Current Outpatient Medications   Medication Sig Dispense Refill     amphetamine-dextroamphetamine (ADDERALL) 20 MG per tablet Take 20 mg by mouth 2 times daily       ibuprofen (ADVIL/MOTRIN) 200 MG tablet Take 600 mg by mouth every 8 hours as needed for mild pain       loratadine-pseudoePHEDrine (CLARITIN-D 12 HOUR) 5-120 MG per 12 hr tablet Take 1 tablet by mouth 2 times daily       melatonin 0.5 mg TABS half-tab Take 1 mg by mouth nightly as needed for sleep       OTC products: None, except as noted above    Allergies   Allergen Reactions     Mold  "Cough, Other (See Comments), Itching and Shortness Of Breath     Pollen Extract Cough, Itching and Other (See Comments)      Latex Allergy: NO    Social History     Tobacco Use     Smoking status: Current Some Day Smoker     Packs/day: 0.50     Start date: 1/1/2001     Smokeless tobacco: Never Used   Substance Use Topics     Alcohol use: Yes     Comment: couple drinks per week socially     History   Drug Use No       REVIEW OF SYSTEMS:   CONSTITUTIONAL: NEGATIVE for fever, chills, change in weight  ENT/MOUTH: NEGATIVE for ear, mouth and throat problems  RESP: NEGATIVE for significant cough or SOB  CV: NEGATIVE for chest pain, palpitations or peripheral edema    EXAM:   /80 (BP Location: Right arm, Patient Position: Chair, Cuff Size: Adult Large)   Pulse 96   Temp 98.5  F (36.9  C) (Oral)   Resp 22   Ht 1.753 m (5' 9\")   Wt 96.2 kg (212 lb)   SpO2 98%   BMI 31.31 kg/m    GENERAL APPEARANCE: healthy, alert and no distress  HENT: ear canals and TM's normal and nose and mouth without ulcers or lesions  RESP: lungs clear to auscultation - no rales, rhonchi or wheezes  CV: regular rate and rhythm, normal S1 S2, no S3 or S4 and no murmur, click or rub   ABDOMEN: soft, nontender, no HSM or masses and bowel sounds normal  NEURO: Normal strength and tone, sensory exam grossly normal, mentation intact and speech normal    DIAGNOSTICS:     Labs Drawn and in Process:   Unresulted Labs Ordered in the Past 30 Days of this Admission     Date and Time Order Name Status Description    2/13/2019 0812 BASIC METABOLIC PANEL In process           HGB- 14.4    basic metabolic panel  Last Comprehensive Metabolic Panel:  Sodium   Date Value Ref Range Status   02/13/2019 138 133 - 144 mmol/L Final     Potassium   Date Value Ref Range Status   02/13/2019 4.8 3.4 - 5.3 mmol/L Final     Chloride   Date Value Ref Range Status   02/13/2019 104 94 - 109 mmol/L Final     Carbon Dioxide   Date Value Ref Range Status   02/13/2019 29 20 - " 32 mmol/L Final     Anion Gap   Date Value Ref Range Status   02/13/2019 5 3 - 14 mmol/L Final     Glucose   Date Value Ref Range Status   02/13/2019 94 70 - 99 mg/dL Final     Urea Nitrogen   Date Value Ref Range Status   02/13/2019 13 7 - 30 mg/dL Final     Creatinine   Date Value Ref Range Status   02/13/2019 1.04 0.66 - 1.25 mg/dL Final     GFR Estimate   Date Value Ref Range Status   02/13/2019 >90 >60 mL/min/[1.73_m2] Final     Comment:     Non  GFR Calc  Starting 12/18/2018, serum creatinine based estimated GFR (eGFR) will be   calculated using the Chronic Kidney Disease Epidemiology Collaboration   (CKD-EPI) equation.       Calcium   Date Value Ref Range Status   02/13/2019 9.5 8.5 - 10.1 mg/dL Final       IMPRESSION:   Reason for surgery/procedure: glenoid tear right shoulder  Diagnosis/reason for consult: clearance    The proposed surgical procedure is considered LOW risk.    REVISED CARDIAC RISK INDEX  The patient has the following serious cardiovascular risks for perioperative complications such as (MI, PE, VFib and 3  AV Block):  No serious cardiac risks  INTERPRETATION: 0 risks: Class I (very low risk - 0.4% complication rate)    The patient has the following additional risks for perioperative complications:  No identified additional risks      ICD-10-CM    1. Preop general physical exam Z01.818 Basic metabolic panel     Hemoglobin   2. Tear of right glenoid labrum, subsequent encounter S43.431D    3. Attention deficit hyperactivity disorder (ADHD), combined type F90.2        RECOMMENDATIONS:     --Consult hospital rounder / IM to assist post-op medical management    Ok to hold [facility] day of surgery    APPROVAL GIVEN to proceed with proposed procedure, without further diagnostic evaluation       Signed Electronically by: Robert Elias MD, MD    Copy of this evaluation report is provided to requesting physician.    [facility] Preop Guidelines    Revised Cardiac Risk Index

## 2019-02-13 NOTE — PATIENT INSTRUCTIONS
Before Your Surgery      Call your surgeon if there is any change in your health. This includes signs of a cold or flu (such as a sore throat, runny nose, cough, rash or fever).    Do not smoke, drink alcohol or take over the counter medicine (unless your surgeon or primary care doctor tells you to) for the 24 hours before and after surgery.    If you take prescribed drugs: Follow your doctor s orders about which medicines to take and which to stop until after surgery.    Eating and drinking prior to surgery: follow the instructions from your surgeon    Take a shower or bath the night before surgery. Use the soap your surgeon gave you to gently clean your skin. If you do not have soap from your surgeon, use your regular soap. Do not shave or scrub the surgery site.  Wear clean pajamas and have clean sheets on your bed.     Hold Adderal day of surgery

## 2019-02-18 ENCOUNTER — THERAPY VISIT (OUTPATIENT)
Dept: PHYSICAL THERAPY | Facility: CLINIC | Age: 34
End: 2019-02-18
Payer: COMMERCIAL

## 2019-02-18 ENCOUNTER — TELEPHONE (OUTPATIENT)
Dept: FAMILY MEDICINE | Facility: CLINIC | Age: 34
End: 2019-02-18

## 2019-02-18 DIAGNOSIS — M25.561 ACUTE PAIN OF RIGHT KNEE: Primary | ICD-10-CM

## 2019-02-18 PROCEDURE — 97112 NEUROMUSCULAR REEDUCATION: CPT | Mod: GP | Performed by: PHYSICAL THERAPIST

## 2019-02-18 PROCEDURE — 97110 THERAPEUTIC EXERCISES: CPT | Mod: GP | Performed by: PHYSICAL THERAPIST

## 2019-02-18 NOTE — TELEPHONE ENCOUNTER
Reason for Call:  Other     Detailed comments: Radha called from [facility] and needs the pre op information from 2/13/19 faxed to [phone] the patient has an appointment there 2/20/19    Phone Number Patient can be reached at: Other phone number:  [phone]    Best Time: any    Can we leave a detailed message on this number? YES    Call taken on 2/18/2019 at 8:15 AM by Christin Goode

## 2019-02-19 ENCOUNTER — TELEPHONE (OUTPATIENT)
Dept: FAMILY MEDICINE | Facility: CLINIC | Age: 34
End: 2019-02-19

## 2019-02-19 NOTE — TELEPHONE ENCOUNTER
Physician Result form received from [employer]. Form filled out, signed and faxed to [phone].    Thank you,  Marianela SHANNON    NE Team Jolie

## 2019-03-14 ENCOUNTER — TELEPHONE (OUTPATIENT)
Dept: FAMILY MEDICINE | Facility: CLINIC | Age: 34
End: 2019-03-14

## 2019-03-14 NOTE — TELEPHONE ENCOUNTER
Reason for Call:  Form, our goal is to have forms completed with 72 hours, however, some forms may require a visit or additional information.    Type of letter, form or note:  medical    Who is the form from?: Patient    Where did the form come from: Patient or family brought in       What clinic location was the form placed at?: [facility])    Where the form was placed: 's Box    What number is listed as a contact on the form?: [phone]       Additional comments: Patient's wife dropped off forms and would like to have a copy of the completed form made and mailed to the address on the pre addressed envelopes included. Please call Earl with any questions.     Call taken on 3/14/2019 at 6:14 PM by Tonya Gaitan

## 2019-03-15 NOTE — TELEPHONE ENCOUNTER
Forms received from: [facility]    Fax listed: [phone]  Date received: March 15, 2019  Form description: Medical Examination Report  Once forms are completed, please mail copy to home address in self stamped envelope and fax a copy to [facility].  Form placed: Robert Elias MD sign me folder.      Marianela BARFIELD Team Jolie

## 2019-03-22 ENCOUNTER — THERAPY VISIT (OUTPATIENT)
Dept: PHYSICAL THERAPY | Facility: CLINIC | Age: 34
End: 2019-03-22
Payer: COMMERCIAL

## 2019-03-22 DIAGNOSIS — M25.511 ACUTE PAIN OF RIGHT SHOULDER: Primary | ICD-10-CM

## 2019-03-22 PROCEDURE — 97110 THERAPEUTIC EXERCISES: CPT | Mod: GP | Performed by: PHYSICAL THERAPIST

## 2019-03-22 PROCEDURE — 97112 NEUROMUSCULAR REEDUCATION: CPT | Mod: GP | Performed by: PHYSICAL THERAPIST

## 2019-03-22 PROCEDURE — 97161 PT EVAL LOW COMPLEX 20 MIN: CPT | Mod: GP | Performed by: PHYSICAL THERAPIST

## 2019-03-22 NOTE — LETTER
[facility]  [address]  [place] [address]  [phone]    2019    Re: Boris Berumen   :   1985  MRN:  [id number]   REFERRING PHYSICIAN:   [facility]    Date of Initial Evaluation:  3/22/19  Visits:   1  Reason for Referral:  Acute pain of right shoulder    [facility] Initial Evaluation    Subjective:  SPADI 57/100     The history is provided by the patient. No  was used.   Boris Berumen is a 33 year old male with a right shoulder condition.  Condition occurred with:  Unknown cause and repetition/overuse.  Condition occurred: during recreation/sport, at home and in the community.  This is a new condition  19, post operative R RCR, SAD, Bicep tenodesis, labral repair .    Patient reports pain:  Anterior, in the joint and lateral.  Radiates to:  Shoulder.  Pain is described as aching and is constant and reported as 3/10.  Associated symptoms:  Loss of motion/stiffness, loss of strength and painful arc. Pain is the same all the time.  Exacerbated by: restricted to use of arm due to sling, table top activity only.  and relieved by bracing/immobilizing and ice.  Since onset symptoms are gradually improving.  Special tests:  X-ray and MRI.  Previous treatment includes physical therapy.  There was mild improvement following previous treatment.  General health as reported by patient is good.  Pertinent medical history includes:  Smoking.  Medical allergies: no.  Other surgeries include:  Orthopedic surgery (R MPFL).  Current medications:  Pain medication.  Current occupation is [employer]  .  Patient is working in normal job with restrictions.  Primary job tasks include:  Driving, lifting, prolonged sitting, prolonged standing, operating a machine and repetitive tasks.  Barriers include:  None as reported by the patient.  Red flags:  None as reported by the  patient.    Objective:  Standing Alignment:    Cervical/Thoracic:  Forward head  Shoulder/UE:  Rounded shoulders    Gait:    Gait Type:  Normal   Assistive Devices:  None  Flexibility/Screens:   Negative screens: Cervical   Neurological: He is alert. He has normal reflexes. No cranial nerve deficit or sensory deficit.     Shoulder Evaluation:  ROM:    PROM:    Flexion:  Left:  160-170    Right: 90    Internal Rotation:  Left:  90    Right:  60  External Rotation:  Left:  80    Right:  0    Strength:  not assessed    Special Tests:  not assessed    Palpation:    Right shoulder tenderness present at: Incisional    Assessment/Plan:    Patient is a 33 year old male with left side shoulder complaints.    Patient has the following significant findings with corresponding treatment plan.                Diagnosis 1:   L shoulder post op RCR, SAD, Bicep Tenodesis, Capsulography      Therapy Evaluation Codes:   1) History comprised of:   Personal factors that impact the plan of care:      extent of surgery and post op precautions/restriction .    Comorbidity factors that impact the plan of care are:      Smoking.     Medications impacting care: None.  2) Examination of Body Systems comprised of:   Body structures and functions that impact the plan of care:      Shoulder.   Activity limitations that impact the plan of care are:      Bathing, Bending, Cooking, Driving, Dressing, Lifting, Working, Sleeping and Laying down.  3) Clinical presentation characteristics are:   Stable/Uncomplicated.  4) Decision-Making    Low complexity using standardized patient assessment instrument and/or measureable assessment of functional outcome.    Cumulative Therapy Evaluation is: Low complexity.  Previous and current functional limitations:  (See Goal Flow Sheet for this information)    Short term and Long term goals: (See Goal Flow Sheet for this information)   Communication ability:  Patient appears to be able to clearly communicate and  understand verbal and written communication and follow directions correctly.  Treatment Explanation - The following has been discussed with the patient:   RX ordered/plan of care  Anticipated outcomes  Possible risks and side effects  This patient would benefit from PT intervention to resume normal activities.   Rehab potential is good.    Frequency:  1 X week, once daily  Duration:  for 6 weeks, then tapering to 1x/week every other week x 12 weeks unless directed different from MD   Discharge Plan:  Achieve all LTG.  Independent in home treatment program.  Reach maximal therapeutic benefit.    Thank you for your referral.    INQUIRIES  Therapist: Elvis Quinn, PT, ATC, Cert. MDT  [facility]  [address] [place] [address]  Phone: [phone]  Fax: [phone]

## 2019-03-23 NOTE — PROGRESS NOTES
[facility] Initial Evaluation  Subjective:  SPADI 57/100       The history is provided by the patient. No  was used.   Boris Berumen is a 33 year old male with a right shoulder condition.  Condition occurred with:  Unknown cause and repetition/overuse.  Condition occurred: during recreation/sport, at home and in the community.  This is a new condition  2/22/19, post operative R RCR, SAD, Bicep tenodesis, labral repair .    Patient reports pain:  Anterior, in the joint and lateral.  Radiates to:  Shoulder.  Pain is described as aching and is constant and reported as 3/10.  Associated symptoms:  Loss of motion/stiffness, loss of strength and painful arc. Pain is the same all the time.  Exacerbated by: restricted to use of arm due to sling, table top activity only.  and relieved by bracing/immobilizing and ice.  Since onset symptoms are gradually improving.  Special tests:  X-ray and MRI.  Previous treatment includes physical therapy.  There was mild improvement following previous treatment.  General health as reported by patient is good.  Pertinent medical history includes:  Smoking.  Medical allergies: no.  Other surgeries include:  Orthopedic surgery (R MPFL).  Current medications:  Pain medication.  Current occupation is [employer]  .  Patient is working in normal job with restrictions.  Primary job tasks include:  Driving, lifting, prolonged sitting, prolonged standing, operating a machine and repetitive tasks.    Barriers include:  None as reported by the patient.    Red flags:  None as reported by the patient.                        Objective:  Standing Alignment:    Cervical/Thoracic:  Forward head  Shoulder/UE:  Rounded shoulders              Gait:    Gait Type:  Normal   Assistive Devices:  None      Flexibility/Screens:   Negative screens: Cervical           Neurological: He is alert. He has normal reflexes. No cranial nerve deficit or sensory deficit.                       Shoulder Evaluation:  ROM:    PROM:    Flexion:  Left:  160-170    Right: 90          Internal Rotation:  Left:  90    Right:  60  External Rotation:  Left:  80    Right:  0                    Strength:  not assessed                        Special Tests:  not assessed      Palpation:      Right shoulder tenderness present at: Incisional                                     General     ROS    Assessment/Plan:    Patient is a 33 year old male with left side shoulder complaints.    Patient has the following significant findings with corresponding treatment plan.                Diagnosis 1:   L shoulder post op RCR, SAD, Bicep Tenodesis, Capsulography      Therapy Evaluation Codes:   1) History comprised of:   Personal factors that impact the plan of care:      extent of surgery and post op precautions/restriction .    Comorbidity factors that impact the plan of care are:      Smoking.     Medications impacting care: None.  2) Examination of Body Systems comprised of:   Body structures and functions that impact the plan of care:      Shoulder.   Activity limitations that impact the plan of care are:      Bathing, Bending, Cooking, Driving, Dressing, Lifting, Working, Sleeping and Laying down.  3) Clinical presentation characteristics are:   Stable/Uncomplicated.  4) Decision-Making    Low complexity using standardized patient assessment instrument and/or measureable assessment of functional outcome.  Cumulative Therapy Evaluation is: Low complexity.    Previous and current functional limitations:  (See Goal Flow Sheet for this information)    Short term and Long term goals: (See Goal Flow Sheet for this information)     Communication ability:  Patient appears to be able to clearly communicate and understand verbal and written communication and follow directions correctly.  Treatment Explanation - The following has been discussed with the patient:   RX ordered/plan of care  Anticipated  outcomes  Possible risks and side effects  This patient would benefit from PT intervention to resume normal activities.   Rehab potential is good.    Frequency:  1 X week, once daily  Duration:  for 6 weeks, then tapering to 1x/week every other week x 12 weeks unless directed different from MD   Discharge Plan:  Achieve all LTG.  Independent in home treatment program.  Reach maximal therapeutic benefit.    Please refer to the daily flowsheet for treatment today, total treatment time and time spent performing 1:1 timed codes.

## 2019-03-29 ENCOUNTER — THERAPY VISIT (OUTPATIENT)
Dept: PHYSICAL THERAPY | Facility: CLINIC | Age: 34
End: 2019-03-29
Payer: COMMERCIAL

## 2019-03-29 DIAGNOSIS — M25.561 ACUTE PAIN OF RIGHT KNEE: ICD-10-CM

## 2019-03-29 DIAGNOSIS — M25.511 ACUTE PAIN OF RIGHT SHOULDER: Primary | ICD-10-CM

## 2019-03-29 PROCEDURE — 97110 THERAPEUTIC EXERCISES: CPT | Mod: GP | Performed by: PHYSICAL THERAPIST

## 2019-03-29 PROCEDURE — 97112 NEUROMUSCULAR REEDUCATION: CPT | Mod: GP | Performed by: PHYSICAL THERAPIST

## 2019-04-15 ENCOUNTER — THERAPY VISIT (OUTPATIENT)
Dept: PHYSICAL THERAPY | Facility: CLINIC | Age: 34
End: 2019-04-15
Payer: COMMERCIAL

## 2019-04-15 PROCEDURE — 97110 THERAPEUTIC EXERCISES: CPT | Mod: GP | Performed by: PHYSICAL THERAPIST

## 2019-04-15 PROCEDURE — 97112 NEUROMUSCULAR REEDUCATION: CPT | Mod: GP | Performed by: PHYSICAL THERAPIST

## 2019-07-19 NOTE — PROGRESS NOTES
Subjective:  HPI                    Objective:  System    Physical Exam    General     ROS    Assessment/Plan:    DISCHARGE REPORT    Progress reporting period is from 3/22/19 to 4/15/19. 3 visits .     SUBJECTIVE  Subjective: hang is 7-8 weeks post op. He feels well. Happy with his outcome. Sleepigwell at night      Current Pain level: 6/10   Initial Pain level: 8/10   Changes in function: Yes, see goal flow sheet for change in function   Adverse reactions: None;   ,       Patient has failed to return to therapy so current objective findings are unknown.  The subjective and objective information are from the last SOAP note on this patient.    OBJECTIVE  Objective: flexion assisted to 145-150 . ER 20, IR 90.       ASSESSMENT/PLAN  Updated problem list and treatment plan: Diagnosis 1:  S/p RCR, bicep tenodesis, labral repair       Assessment of Progress: The patient's condition is improving.  The patient has not returned to therapy. Current status is unknown.    The patient is returning to your office for a recheck appointment.  The patient failed to complete scheduled/ordered appointments so current information is unknown.  We will discharge this patient from PT.    Recommendations:  This patient would benefit from further evaluation.    Please refer to the daily flowsheet for treatment today, total treatment time and time spent performing 1:1 timed codes.

## 2020-08-10 ENCOUNTER — OFFICE VISIT (OUTPATIENT)
Dept: FAMILY MEDICINE | Facility: CLINIC | Age: 35
End: 2020-08-10
Payer: COMMERCIAL

## 2020-08-10 VITALS
HEIGHT: 68 IN | WEIGHT: 213.8 LBS | DIASTOLIC BLOOD PRESSURE: 88 MMHG | TEMPERATURE: 98.2 F | SYSTOLIC BLOOD PRESSURE: 136 MMHG | BODY MASS INDEX: 32.4 KG/M2 | HEART RATE: 92 BPM

## 2020-08-10 DIAGNOSIS — G47.9 SLEEP DISTURBANCE: ICD-10-CM

## 2020-08-10 DIAGNOSIS — B35.1 ONYCHOMYCOSIS: ICD-10-CM

## 2020-08-10 DIAGNOSIS — G43.109 MIGRAINE WITH AURA AND WITHOUT STATUS MIGRAINOSUS, NOT INTRACTABLE: ICD-10-CM

## 2020-08-10 DIAGNOSIS — Z00.00 ROUTINE GENERAL MEDICAL EXAMINATION AT A HEALTH CARE FACILITY: Primary | ICD-10-CM

## 2020-08-10 PROCEDURE — 99213 OFFICE O/P EST LOW 20 MIN: CPT | Mod: 25 | Performed by: FAMILY MEDICINE

## 2020-08-10 PROCEDURE — 99395 PREV VISIT EST AGE 18-39: CPT | Performed by: FAMILY MEDICINE

## 2020-08-10 RX ORDER — TERBINAFINE HYDROCHLORIDE 250 MG/1
250 TABLET ORAL DAILY
Qty: 90 TABLET | Refills: 0 | Status: SHIPPED | OUTPATIENT
Start: 2020-08-10 | End: 2020-10-21

## 2020-08-10 RX ORDER — SUMATRIPTAN 50 MG/1
50 TABLET, FILM COATED ORAL
Qty: 20 TABLET | Refills: 3 | Status: SHIPPED | OUTPATIENT
Start: 2020-08-10 | End: 2021-06-29

## 2020-08-10 ASSESSMENT — MIFFLIN-ST. JEOR: SCORE: 1874.29

## 2020-08-10 ASSESSMENT — PAIN SCALES - GENERAL: PAINLEVEL: NO PAIN (0)

## 2020-08-10 NOTE — PATIENT INSTRUCTIONS
Preventive Health Recommendations  Male Ages 26 - 39    Yearly exam:             See your health care provider every year in order to  o   Review health changes.   o   Discuss preventive care.    o   Review your medicines if your doctor has prescribed any.    You should be tested each year for STDs (sexually transmitted diseases), if you re at risk.     After age 35, talk to your provider about cholesterol testing. If you are at risk for heart disease, have your cholesterol tested at least every 5 years.     If you are at risk for diabetes, you should have a diabetes test (fasting glucose).  Shots: Get a flu shot each year. Get a tetanus shot every 10 years.     Nutrition:    Eat at least 5 servings of fruits and vegetables daily.     Eat whole-grain bread, whole-wheat pasta and brown rice instead of white grains and rice.     Get adequate Calcium and Vitamin D.     Lifestyle    Exercise for at least 150 minutes a week (30 minutes a day, 5 days a week). This will help you control your weight and prevent disease.     Limit alcohol to one drink per day.     No smoking.     Wear sunscreen to prevent skin cancer.     See your dentist every six months for an exam and cleaning.       Decrease your calorie intake by 400-600 calories per day    SLEEP EVALUATION & MANAGEMENT REFERRAL - [facility] [phone] (Age 15 and up) [[id number]]      Orders Placed This Encounter     Hepatic panel (Albumin, ALT, AST, Bili, Alk Phos, TP)     Standing Status:   Future     Standing Expiration Date:   8/10/2021     SLEEP EVALUATION & MANAGEMENT REFERRAL - [facility] [phone] (Age 15 and up)     Standing Status:   Future     Standing Expiration Date:   8/10/2021     Referral Priority:   Routine     Number of Visits Requested:   1     SUMAtriptan (IMITREX) 50 MG tablet     Sig: Take 1 tablet (50 mg) by mouth at onset of headache for migraine May repeat in 2 hours. Max 4  tablets/24 hours.     Dispense:  20 tablet     Refill:  3     terbinafine (LAMISIL) 250 MG tablet     Sig: Take 1 tablet (250 mg) by mouth daily     Dispense:  90 tablet     Refill:  0

## 2020-08-10 NOTE — PROGRESS NOTES
SUBJECTIVE:   CC: Boris Berumen is an 34 year old male who presents for preventative health visit.     Healthy Habits:    Getting at least 3 servings of Calcium per day:  NO    Bi-annual eye exam:  NO    Dental care twice a year:  Yes    Sleep apnea or symptoms of sleep apnea:  Excessive snoring and Daytime drowsiness    Diet:  Other (Limit Lactose intake )    Frequency of exercise:  None    Duration of exercise:  N/A    Taking medications regularly:  Yes    Barriers to taking medications:  Not applicable    Medication side effects:  Not applicable    PHQ-2 Total Score:    Additional concerns today:  Yes (Snoring, Migranes, Right Big toe )      Has hd increasing Migraines. Has auras and photophobia. Also gets nausea.  He has a hx of concussions from athletic competition. No other focal neuro symptoms    Has had abnormal toenail for years. Would like to treat if it is fungal    Reviewed risks and benefits of treatment. Liver concerns with ani-tfungals,         Today's PHQ-2 Score:   PHQ-2 ( 1999 Pfizer) 2/13/2019   Q1: Little interest or pleasure in doing things 0   Q2: Feeling down, depressed or hopeless 0   PHQ-2 Score 0   Q1: Little interest or pleasure in doing things -   Q2: Feeling down, depressed or hopeless -   PHQ-2 Score -       Abuse: Current or Past(Physical, Sexual or Emotional)- No  Do you feel safe in your environment? Yes        Social History     Tobacco Use     Smoking status: Current Some Day Smoker     Packs/day: 0.50     Start date: 1/1/2001     Smokeless tobacco: Never Used   Substance Use Topics     Alcohol use: Yes     Comment: couple drinks per week socially     If you drink alcohol do you typically have >3 drinks per day or >7 drinks per week? No    No flowsheet data found.    Last PSA: No results found for: PSA    Reviewed orders with patient. Reviewed health maintenance and updated orders accordingly - Yes  BP Readings from Last 3 Encounters:   08/10/20 136/88   02/13/19 130/80   12/06/18  "128/68    Wt Readings from Last 3 Encounters:   08/10/20 97 kg (213 lb 12.8 oz)   02/13/19 96.2 kg (212 lb)   12/06/18 95.5 kg (210 lb 9.6 oz)                    Reviewed and updated as needed this visit by clinical staff  Tobacco  Allergies  Meds  Med Hx  Surg Hx  Fam Hx  Soc Hx        Reviewed and updated as needed this visit by Provider            Review of Systems  CONSTITUTIONAL: NEGATIVE for fever, chills, change in weight  INTEGUMENTARY/SKIN: changes in toenail  EYES: NEGATIVE for vision changes or irritation  ENT: NEGATIVE for ear, mouth and throat problems  RESP: NEGATIVE for significant cough or SOB  CV: NEGATIVE for chest pain, palpitations or peripheral edema  GI: NEGATIVE for nausea, abdominal pain, heartburn, or change in bowel habits   male: negative for dysuria, hematuria, decreased urinary stream, erectile dysfunction, urethral discharge  MUSCULOSKELETAL: NEGATIVE for significant arthralgias or myalgia  NEURO: NEGATIVE for weakness, dizziness or paresthesias  PSYCHIATRIC: NEGATIVE for changes in mood or affect    OBJECTIVE:   /88 (BP Location: Right arm, Patient Position: Sitting, Cuff Size: Adult Regular)   Pulse 92   Temp 98.2  F (36.8  C) (Oral)   Ht 1.727 m (5' 8\")   Wt 97 kg (213 lb 12.8 oz)   BMI 32.51 kg/m      Physical Exam  GENERAL: healthy, alert and no distress  NECK: no adenopathy, no asymmetry, masses, or scars and thyroid normal to palpation  RESP: lungs clear to auscultation - no rales, rhonchi or wheezes  CV: regular rate and rhythm, normal S1 S2, no S3 or S4, no murmur, click or rub, no peripheral edema and peripheral pulses strong  ABDOMEN: soft, nontender, no hepatosplenomegaly, no masses and bowel sounds normal  MS: no gross musculoskeletal defects noted, no edema  SKIN: right great toenail with nail disruption.   NEURO: Normal strength and tone, mentation intact and speech normal  PSYCH: mentation appears normal, affect normal/bright    Diagnostic Test " "Results:  none     ASSESSMENT/PLAN:   (Z00.00) Routine general medical examination at a health care facility  (primary encounter diagnosis)  Comment: overall healty  Plan: reviewed diet, weight loss    (G47.9) Sleep disturbance  Comment:   Plan: SLEEP EVALUATION & MANAGEMENT REFERRAL - ADULT         -[facility]         [phone] (Age 15 and up)            (G43.109) Migraine with aura and without status migrainosus, not intractable  Comment:   Plan: SUMAtriptan (IMITREX) 50 MG tablet            (B35.1) Onychomycosis  Comment:   Plan: terbinafine (LAMISIL) 250 MG tablet, Hepatic         panel (Albumin, ALT, AST, Bili, Alk Phos, TP)        Recommend checking LFT's one month after initiating medication      COUNSELING:   Reviewed preventive health counseling, as reflected in patient instructions       Regular exercise       Healthy diet/nutrition    Estimated body mass index is 32.51 kg/m  as calculated from the following:    Height as of this encounter: 1.727 m (5' 8\").    Weight as of this encounter: 97 kg (213 lb 12.8 oz).     Weight management plan: Discussed healthy diet and exercise guidelines     reports that he has been smoking. He started smoking about 19 years ago. He has been smoking about 0.50 packs per day. He has never used smokeless tobacco.      Counseling Resources:  ATP IV Guidelines  Pooled Cohorts Equation Calculator  FRAX Risk Assessment  ICSI Preventive Guidelines  Dietary Guidelines for Americans, 2010  USDA's MyPlate  ASA Prophylaxis  Lung CA Screening    Robert Elias MD, MD  [facility]  "

## 2020-08-20 ENCOUNTER — TELEPHONE (OUTPATIENT)
Dept: FAMILY MEDICINE | Facility: CLINIC | Age: 35
End: 2020-08-20

## 2020-08-20 NOTE — LETTER
August 20, 2020      Boris Berumen  [address] [address]        Dear Parent/Guardian of Boris,    We care about your health and have reviewed your health plan. We have reviewed your medical conditions, medication list, and lab results and are making recommendations based on this review, to better manage your health.    You are in particular need of attention regarding:  - PHQ2 Please fill out and mail back or call the clinic with your answers.     Here is a list of Health Maintenance topics that are due now or due soon:  Health Maintenance Due   Topic Date Due     HIV SCREENING  09/23/1998     PNEUMOCOCCAL IMMUNIZATION 19-64 MEDIUM RISK (1 of 1 - PPSV23) 09/23/2002     PHQ-2  01/01/2020       Please call us at [phone] (or use [employer]) to address the above recommendations.     Thank you for trusting [facility] with your healthcare needs. We appreciate the opportunity to serve you and look forward to supporting you in the future.    Healthy Regards,    Your Care Team

## 2020-08-20 NOTE — TELEPHONE ENCOUNTER
Panel Management Review      Patient has the following on his problem list: None      Composite cancer screening  Chart review shows that this patient is due/due soon for the following None  Summary:    Patient is due/failing the following:   PHQ2    Action needed:   PHQ2    Type of outreach:    Sent letter.    Questions for provider review:    None                                                                                                                                    Josefina Redd CMA      Chart routed to Care Team.

## 2020-08-20 NOTE — LETTER
August 20, 2020      Boris Berumen  [address] [address]          Dear Boris,    We care about your health and have reviewed your health plan. We have reviewed your medical conditions, medication list, and lab results and are making recommendations based on this review, to better manage your health.    You are in particular need of attention regarding:  - PHQ2 - please fill out and mail back to clinic    Here is a list of Health Maintenance topics that are due now or due soon:  Health Maintenance Due   Topic Date Due     HIV SCREENING  09/23/1998     PNEUMOCOCCAL IMMUNIZATION 19-64 MEDIUM RISK (1 of 1 - PPSV23) 09/23/2002     PHQ-2  01/01/2020       Please call us at [phone] (or use [employer]) to address the above recommendations.     Thank you for trusting [facility] with your healthcare needs. We appreciate the opportunity to serve you and look forward to supporting you in the future.    Healthy Regards,    Your Care Team

## 2020-10-21 ENCOUNTER — VIRTUAL VISIT (OUTPATIENT)
Dept: SLEEP MEDICINE | Facility: CLINIC | Age: 35
End: 2020-10-21
Attending: FAMILY MEDICINE
Payer: COMMERCIAL

## 2020-10-21 DIAGNOSIS — R06.83 LOUD SNORING: Primary | ICD-10-CM

## 2020-10-21 DIAGNOSIS — E66.09 CLASS 1 OBESITY DUE TO EXCESS CALORIES WITHOUT SERIOUS COMORBIDITY WITH BODY MASS INDEX (BMI) OF 32.0 TO 32.9 IN ADULT: ICD-10-CM

## 2020-10-21 DIAGNOSIS — G43.909 MIGRAINE WITHOUT STATUS MIGRAINOSUS, NOT INTRACTABLE, UNSPECIFIED MIGRAINE TYPE: ICD-10-CM

## 2020-10-21 DIAGNOSIS — G47.19 EXCESSIVE DAYTIME SLEEPINESS: ICD-10-CM

## 2020-10-21 DIAGNOSIS — E66.811 CLASS 1 OBESITY DUE TO EXCESS CALORIES WITHOUT SERIOUS COMORBIDITY WITH BODY MASS INDEX (BMI) OF 32.0 TO 32.9 IN ADULT: ICD-10-CM

## 2020-10-21 DIAGNOSIS — F51.3 SLEEP WALKING DISORDER: ICD-10-CM

## 2020-10-21 DIAGNOSIS — G47.9 SLEEP DISTURBANCE: ICD-10-CM

## 2020-10-21 PROCEDURE — 99203 OFFICE O/P NEW LOW 30 MIN: CPT | Mod: 95 | Performed by: INTERNAL MEDICINE

## 2020-10-21 SDOH — HEALTH STABILITY: MENTAL HEALTH: HOW OFTEN DO YOU HAVE A DRINK CONTAINING ALCOHOL?: 2-3 TIMES A WEEK

## 2020-10-21 SDOH — HEALTH STABILITY: MENTAL HEALTH: HOW OFTEN DO YOU HAVE 6 OR MORE DRINKS ON ONE OCCASION?: NOT ASKED

## 2020-10-21 SDOH — HEALTH STABILITY: MENTAL HEALTH: HOW MANY STANDARD DRINKS CONTAINING ALCOHOL DO YOU HAVE ON A TYPICAL DAY?: 1 OR 2

## 2020-10-21 NOTE — PROGRESS NOTES
"Boris Berumen is a 35 year old male who is being evaluated via a billable video visit.      The patient has been notified of following:     \"This video visit will be conducted via a call between you and your physician/provider. We have found that certain health care needs can be provided without the need for an in-person physical exam.  This service lets us provide the care you need with a video conversation.  If a prescription is necessary we can send it directly to your pharmacy.  If lab work is needed we can place an order for that and you can then stop by our lab to have the test done at a later time.    Video visits are billed at different rates depending on your insurance coverage.  Please reach out to your insurance provider with any questions.    If during the course of the call the physician/provider feels a video visit is not appropriate, you will not be charged for this service.\"    Patient has given verbal consent for Video visit? Yes  How would you like to obtain your AVS? MyChart  If you are dropped from the video visit, the video invite should be resent to: Text to cell phone: [phone]  Will anyone else be joining your video visit? No        Video-Visit Details    Type of service:  Video Visit    Video Start Time: 10:23 AM  Video End Time: 10:59 AM    Originating Location (pt. Location): Home    Distant Location (provider location):  Home Office    Platform used for Video Visit: Deanne Mujica MD    Chief complaint:Consultation requested by Robert Elias MD for the evaluation of excessive daytime sleepiness and snoring    History of Present Illness: 35-year-old gentleman with history of ADHD on stimulants, chronic allergies, loud snoring.  His snoring has worsened despite sleeping on his side and taking decongestants and antihistamines.  He has been getting elbowed a lot at night.  He has sleep disruption with frequent awakenings at night.  He does not necessarily need to go " to the bathroom.  He fell asleep in a chair recently and his family members observed him to snore loudly and that it appeared that he was waking himself up frequently.  No clear observed apneas.  He reports being exhausted during the day.  He drinks a pot of coffee with the last caffeine around 4 PM usually.  He also takes stimulants twice a day.  Last at around noon.      He has a history of sleepwalking since childhood.  He has not left the house.  He has left the bedroom and is difficult to awaken.  There is been no injury to self or others.  He is not sure how often it is happening could be once a month or so.      He has frequent nocturnal reflux symptoms.  He does not wake up with headaches but has headaches during the day.  These have been increasing in frequency.  In the last 5 months sleep has been even more disrupted due to adopting of an infant.  His sleep schedule is irregular.  Typically going to bed anywhere from 8 PM to 2 AM.  Usually getting out of bed by 4 AM to 9 AM.      Total score - Adams: 10 (10/21/2020 10:00 AM) (Less than 10 normal)    BECKIE Total Score: 18 (normal 0-7, mild 8-14, moderate 15-21, severe 22-28)    STOP-BANG  Loud Snore   1  Excessively Tired/Sleepy   1  Observed apnea   0  Hypertension   0  BMI> 35 kg/m2   0  Age >50   0  Neck >16 in/40cm   1  (17 inches shirt collar)  Male Gender   1  Total =   4  (0-2 low, 3-4 intermediate, 5-8 high risk of GUSTAVO)      Past Medical History:   Diagnosis Date     Attention deficit hyperactivity disorder (ADHD), combined type 2002     Chronic migraine without aura without status migrainosus, not intractable        Allergies   Allergen Reactions     Mold Cough, Other (See Comments), Itching and Shortness Of Breath     Pollen Extract Cough, Itching and Other (See Comments)       Current Outpatient Medications   Medication     amphetamine-dextroamphetamine (ADDERALL) 20 MG per tablet     ibuprofen (ADVIL/MOTRIN) 200 MG tablet      loratadine-pseudoePHEDrine (CLARITIN-D 12 HOUR) 5-120 MG per 12 hr tablet     melatonin 0.5 mg TABS half-tab     SUMAtriptan (IMITREX) 50 MG tablet     No current facility-administered medications for this visit.        Social History     Socioeconomic History     Marital status:      Spouse name: Not on file     Number of children: Not on file     Years of education: Not on file     Highest education level: Not on file   Occupational History     Not on file   Social Needs     Financial resource strain: Not on file     Food insecurity     Worry: Not on file     Inability: Not on file     Transportation needs     Medical: Not on file     Non-medical: Not on file   Tobacco Use     Smoking status: Current Some Day Smoker     Packs/day: 0.50     Start date: 1/1/2001     Smokeless tobacco: Never Used   Substance and Sexual Activity     Alcohol use: Yes     Frequency: 2-3 times a week     Drinks per session: 1 or 2     Comment: couple drinks per week socially     Drug use: No     Sexual activity: Not on file   Lifestyle     Physical activity     Days per week: Not on file     Minutes per session: Not on file     Stress: Not on file   Relationships     Social connections     Talks on phone: Not on file     Gets together: Not on file     Attends [employer] service: Not on file     Active member of club or organization: Not on file     Attends meetings of clubs or organizations: Not on file     Relationship status: Not on file     Intimate partner violence     Fear of current or ex partner: Not on file     Emotionally abused: Not on file     Physically abused: Not on file     Forced sexual activity: Not on file   Other Topics Concern     Parent/sibling w/ CABG, MI or angioplasty before 65F 55M? Not Asked   Social History Narrative     Not on file       Family History   Problem Relation Age of Onset     Sleep Apnea Mother      Sleep Apnea Father      Snoring Brother      Heart Disease No family hx of        Review of  "Systems: Positve per HPI, otherwise comprehensive review of systems is negative.    EXAM:  Last vitals /88 O2 sat 98% RA Wt 213 lbs BMI 32.5  GENERAL: Healthy, alert and no distress  EYES: Eyes grossly normal to inspection.  No discharge or erythema, or obvious scleral/conjunctival abnormalities.  RESP: No audible wheeze, cough, or visible cyanosis.  No visible retractions or increased work of breathing.    SKIN: Visible skin clear. No significant rash, abnormal pigmentation or lesions.  NEURO: Cranial nerves grossly intact.  Mentation and speech appropriate for age.  PSYCH: Mentation appears normal, affect normal/bright, judgement and insight intact, normal speech and appearance well-groomed.     TSH 11/6/2014 1.38 (N)    ASSESSMENT:  35-year-old gentleman with ADHD on stimulants, daytime sleepiness and loud disruptive snoring, nocturnal reflux, increasing frequency of migraines, weight gain, long history of sleepwalking.  He certainly has increased risk for obstructive sleep apnea which can aggravate sleepwalking in adult.  Irregular sleep schedule and excessive caffeine/stimulants can also contribute to sleep disruption and sleepwalking.    PLAN:  Extensive discussion regarding pathophysiology of obstructive sleep apnea and sleepwalking.  We reviewed the importance of treating sleep apnea due to his symptoms and to decrease cardiovascular risk.  Recommended testing.  Discussed home sleep apnea testing and in lab testing.  Recommended home sleep apnea testing at this time.  If he should have significant sleep apnea recommended a trial of CPAP it is as it is the fastest and most effective therapy.  Oral appliance therapy and weight management was also discussed.  Patient was encouraged to try to minimize fluctuations in his bedtime and rise time as much as possible.  Encouraged to monitor and minimize excessive caffeine.  Consider \"3 before 3\".  Also watch alcohol use and avoid more having more than 1.  " Please see patient instructions for further details of counseling provided.    Michelle Mujica M.D.  Pulmonary/Critical Care/Sleep Medicine    [facility]   Floor 1, [address]   [address] [place], [place] [address]. [place] [address]   Appointments: [phone]    The above note was dictated using voice recognition software and may include typographical errors. Please contact the author for any clarifications.

## 2020-10-21 NOTE — PATIENT INSTRUCTIONS
"MY TREATMENT INFORMATION FOR SLEEP APNEA-  Boris Berumen    DOCTOR : Michelle Mujica MD      Am I having a sleep study at a sleep center? No  Make sure you have an appointment for the study before you leave!    Am I having a home sleep study? Ordered  You will get a call in  the next three to four weeks to schedule test  Watch this video:  /drop off device-   [email or web address]/watch?v=yGGFBdELGhk  Disposable device sent out require phone/computer application-   https://[email or web address]/watch?v=BCce_vbiwxE  Please verify your insurance coverage with your insurance carrier    Frequently asked questions:  1. What is Obstructive Sleep Apnea (GUSTAVO)? GUSTAVO is the most common type of sleep apnea. Apnea means, \"without breath.\"  Apnea is most often caused by narrowing or collapse of the upper airway as muscles relax during sleep.   Almost everyone has occasional apneas. Most people with sleep apnea have had brief interruptions at night frequently for many years.  The severity of sleep apnea is related to how frequent and severe the events are.   2. What are the consequences of GUSTAVO? Symptoms include: feeling sleepy during the day, snoring loudly, gasping or stopping of breathing, trouble sleeping, and occasionally morning headaches or heartburn at night.  Sleepiness can be serious and even increase the risk of falling asleep while driving. Other health consequences may include development of high blood pressure and other cardiovascular disease in persons who are susceptible. Untreated GUSTAVO  can contribute to heart disease, stroke and diabetes.   3. What are the treatment options? In most situations, sleep apnea is a lifelong disease that must be managed with daily therapy. Medications are not effective for sleep apnea and surgery is generally not considered until other therapies have been tried. Your treatment is your choice . Continuous Positive Airway (CPAP) works right away and is the therapy that is " effective in nearly everyone. An oral device to hold your jaw forward is usually the next most reliable option. Other options include postioning devices (to keep you off your back), weight loss, and surgery including a tongue pacing device. There is more detail about some of these options below.    Important tips for using CPAP and similar devices   Know your equipment:  CPAP is continuous positive airway pressure that prevents obstructive sleep apnea by keeping the throat from collapsing while you are sleeping. In most cases, the device is  smart  and can slowly self-adjusts if your throat collapses and keeps a record every day of how well you are treated-this information is available to you and your care team.  BPAP is bilevel positive airway pressure that keeps your throat open and also assists each breath with a pressure boost to maintain adequate breathing.  Special kinds of BPAP are used in patients who have inadequate breathing from lung or heart disease. In most cases, the device is  smart  and can slowly self-adjusts to assist breathing. Like CPAP, the device keeps a record of how well you are treated.  Your mask is your connection to the device. You get to choose what feels most comfortable and the staff will help to make sure if fits. Here: are some examples of the different masks that are available:       Key points to remember on your journey with sleep apnea:  1. Sleep study.  PAP devices often need to be adjusted during a sleep study to show that they are effective and adjusted right.  2. Good tips to remember: Try wearing just the mask during a quiet time during the day so your body adapts to wearing it. A humidifier is recommended for comfort in most cases to prevent drying of your nose and throat. Allergy medication from your provider may help you if you are having nasal congestion.  3. Getting settled-in. It takes more than one night for most of us to get used to wearing a mask. Try wearing just  the mask during a quiet time during the day so your body adapts to wearing it. A humidifier is recommended for comfort in most cases. Our team will work with you carefully on the first day and will be in contact within 4 days and again at 2 and 4 weeks for advice and remote device adjustments. Your therapy is evaluated by the device each day.   4. Use it every night. The more you are able to sleep naturally for 7-8 hours, the more likely you will have good sleep and to prevent health risks or symptoms from sleep apnea. Even if you use it 4 hours it helps. Occasionally all of us are unable to use a medical therapy, in sleep apnea, it is not dangerous to miss one night.   5. Communicate. Call our skilled team on the number provided on the first day if your visit for problems that make it difficult to wear the device. Over 2 out of 3 patients can learn to wear the device long-term with help from our team. Remember to call our team or your sleep providers if you are unable to wear the device as we may have other solutions for those who cannot adapt to mask CPAP therapy. It is recommended that you sleep your sleep provider within the first 3 months and yearly after that if you are not having problems.   6. Use it for your health. We encourage use of CPAP masks during daytime quiet periods to allow your face and brain to adapt to the sensation of CPAP so that it will be a more natural sensation to awaken to at night or during naps. This can be very useful during the first few weeks or months of adapting to CPAP though it does not help medically to wear CPAP during wakefulness and  should not be used as a strategy just to meet guidelines.  7. Take care of your equipment. Make sure you clean your mask and tubing using directions every day and that your filter and mask are replaced as recommended or if they are not working.     BESIDES CPAP, WHAT OTHER THERAPIES ARE THERE?    Positioning Device  Positioning devices are  generally used when sleep apnea is mild and only occurs on your back.This example shows a pillow that straps around the waist. It may be appropriate for those whose sleep study shows milder sleep apnea that occurs primarily when lying flat on one's back. Preliminary studies have shown benefit but effectiveness at home may need to be verified by a home sleep test. These devices are generally not covered by medical insurance.  Examples of devices that maintain sleeping on the back to prevent snoring and mild sleep apnea.    Belt type body positioner  Http://[employer]/    Electronic reminder  Http://nightshifttherapy.com/  Http://www.[employer]Chat[employer].au/      Oral Appliance  What is oral appliance therapy?  An oral appliance device fits on your teeth at night like a retainer used after having braces. The device is made by a specialized dentist and requires several visits over 1-2 months before a manufactured device is made to fit your teeth and is adjusted to prevent your sleep apnea. Once an oral device is working properly, snoring should be improved. A home sleep test may be recommended at that time if to determine whether the sleep apnea is adequately treated.       Some things to remember:  -Oral devices are often, but not always, covered by your medical insurance. Be sure to check with your insurance provider.   -If you are referred for oral therapy, you will be given a list of specialized dentists to consider or you may choose to visit the Web site of the American Academy of Dental Sleep Medicine  -Oral devices are less likely to work if you have severe sleep apnea or are extremely overweight.     More detailed information  An oral appliance is a small acrylic device that fits over the upper and lower teeth  (similar to a retainer or a mouth guard). This device slightly moves jaw forward, which moves the base of the tongue forward, opens the airway, improves breathing for effective treat snoring and obstructive  sleep apnea in perhaps 7 out of 10 people .  The best working devices are custom-made by a dental device  after a mold is made of the teeth 1, 2, 3.  When is an oral appliance indicated?  Oral appliance therapy is recommended as a first-line treatment for patients with primary snoring, mild sleep apnea, and for patients with moderate sleep apnea who prefer appliance therapy to use of CPAP4, 5. Severity of sleep apnea is determined by sleep testing and is based on the number of respiratory events per hour of sleep.   How successful is oral appliance therapy?  The success rate of oral appliance therapy in patients with mild sleep apnea is 75-80% while in patients with moderate sleep apnea it is 50-70%. The chance of success in patients with severe sleep apnea is 40-50%. The research also shows that oral appliances have a beneficial effect on the cardiovascular health of GUSTAVO patients at the same magnitude as CPAP therapy7.  Oral appliances should be a second-line treatment in cases of severe sleep apnea, but if not completely successful then a combination therapy utilizing CPAP plus oral appliance therapy may be effective. Oral appliances tend to be effective in a broad range of patients although studies show that the patients who have the highest success are females, younger patients, those with milder disease, and less severe obesity. 3, 6.   Finding a dentist that practices dental sleep medicine  Specific training is available through the American Academy of Dental Sleep Medicine for dentists interested in working in the field of sleep. To find a dentist who is educated in the field of sleep and the use of oral appliances, near you, visit the Web site of the American Academy of Dental Sleep Medicine.    References  1. Samantha et al. Objectively measured vs self-reported compliance during oral appliance therapy for sleep-disordered breathing. Chest 2013; 144(5): 3133-3689.  2. Anne et al.  Objective measurement of compliance during oral appliance therapy for sleep-disordered breathing. Thorax 2013; 68(1): 91-96.  3. [place] et al. Mandibular advancement devices in 620 men and women with GUSTAVO and snoring: tolerability and predictors of treatment success. Chest 2004; 125: 1974-2555.  4. Sourav et al. Oral appliances for snoring and GUSTAVO: a review. Sleep 2006; 29: 244-262.  5. Jim et al. Oral appliance treatment for GUSTAVO: an update. J Clin Sleep Med 2014; 10(2): 215-227.  6. Ruth et al. Predictors of OSAH treatment outcome. J Dent Res 2007; 86: 6381-2140.      Weight Loss:    Weight loss is a long-term strategy that may improve sleep apnea in some patients.    Weight management is a personal decision and the decision should be based on your interest and the potential benefits.  If you are interested in exploring weight loss strategies, the following discussion covers the impact on weight loss on sleep apnea and the approaches that may be successful.    Being overweight does not necessarily mean you will have health consequences.  Those who have BMI over 35 or over 27 with existing medical conditions carries greater risk.   Weight loss decreases severity of sleep apnea in most people with obesity. For those with mild obesity who have developed snoring with weight gain, even 15-30 pound weight loss can improve and occasionally eliminate sleep apnea.  Structured and life-long dietary and health habits are necessary to lose weight and keep healthier weight levels.     Though there may be significant health benefits from weight loss, long-term weight loss is very difficult to achieve- studies show success with dietary management in less than 10% of people. In addition, substantial weight loss may require years of dietary control and may be difficult if patients have severe obesity. In these cases, surgical management may be considered.  Finally, older individuals who have tolerated obesity  without health complications may be less likely to benefit from weight loss strategies.        Your BMI is There is no height or weight on file to calculate BMI.  Weight management is a personal decision.  If you are interested in exploring weight loss strategies, the following discussion covers the approaches that may be successful. Body mass index (BMI) is one way to tell whether you are at a healthy weight, overweight, or obese. It measures your weight in relation to your height.  A BMI of 18.5 to 24.9 is in the healthy range. A person with a BMI of 25 to 29.9 is considered overweight, and someone with a BMI of 30 or greater is considered obese. More than two-thirds of American adults are considered overweight or obese.  Being overweight or obese increases the risk for further weight gain. Excess weight may lead to heart disease and diabetes.  Creating and following plans for healthy eating and physical activity may help you improve your health.  Weight control is part of healthy lifestyle and includes exercise, emotional health, and healthy eating habits. Careful eating habits lifelong are the mainstay of weight control. Though there are significant health benefits from weight loss, long-term weight loss with diet alone may be very difficult to achieve- studies show long-term success with dietary management in less than 10% of people. Attaining a healthy weight may be especially difficult to achieve in those with severe obesity. In some cases, medications, devices and surgical management might be considered.  What can you do?  If you are overweight or obese and are interested in methods for weight loss, you should discuss this with your provider.     Consider reducing daily calorie intake by 500 calories.     Keep a food journal.     Avoiding skipping meals, consider cutting portions instead.    Diet combined with exercise helps maintain muscle while optimizing fat loss. Strength training is particularly  important for building and maintaining muscle mass. Exercise helps reduce stress, increase energy, and improves fitness. Increasing exercise without diet control, however, may not burn enough calories to loose weight.       Start walking three days a week 10-20 minutes at a time    Work towards walking thirty minutes five days a week     Eventually, increase the speed of your walking for 1-2 minutes at time    In addition, we recommend that you review healthy lifestyles and methods for weight loss available through the National Institutes of Health patient information sites:  http://win.niddk.nih.gov/publications/index.htm    And look into health and wellness programs that may be available through your health insurance provider, employer, local community [place], or k[employer]ia club.    Weight management plan: Patient was referred to their PCP to discuss a diet and exercise plan.      Surgery:    Surgery for obstructive sleep apnea is considered generally only when other therapies fail to work. Surgery may be discussed with you if you are having a difficult time tolerating CPAP and or when there is an abnormal structure that requires surgical correction.  Nose and throat surgeries often enlarge the airway to prevent collapse.  Most of these surgeries create pain for 1-2 weeks and up to half of the most common surgeries are not effective throughout life.  You should carefully discuss the benefits and drawbacks to surgery with your sleep provider and surgeon to determine if it is the best solution for you.   More information  Surgery for GUSTAVO is directed at areas that are responsible for narrowing or complete obstruction of the airway during sleep.  There are a wide range of procedures available to enlarge and/or stabilize the airway to prevent blockage of breathing in the three major areas where it can occur: the palate, tongue, and nasal regions.  Successful surgical treatment depends on the accurate identification of the  factors responsible for obstructive sleep apnea in each person.  A personalized approach is required because there is no single treatment that works well for everyone.  Because of anatomic variation, consultation with an examination by a sleep surgeon is a critical first step in determining what surgical options are best for each patient.  In some cases, examination during sedation may be recommended in order to guide the selection of procedures.  Patients will be counseled about risks and benefits as well as the typical recovery course after surgery. Surgery is typically not a cure for a person s GUSTAVO.  However, surgery will often significantly improve one s GUSTAVO severity (termed  success rate ).  Even in the absence of a cure, surgery will decrease the cardiovascular risk associated with OSA7; improve overall quality of life8 (sleepiness, functionality, sleep quality, etc).      Palate Procedures:  Patients with GUSTAVO often have narrowing of their airway in the region of their tonsils and uvula.  The goals of palate procedures are to widen the airway in this region as well as to help the tissues resist collapse.  Modern palate procedure techniques focus on tissue conservation and soft tissue rearrangement, rather than tissue removal.  Often the uvula is preserved in this procedure. Residual sleep apnea is common in patient after pharyngoplasty with an average reduction in sleep apnea events of 33%2.      Tongue Procedures:  ExamWhile patients are awake, the muscles that surround the throat are active and keep this region open for breathing. These muscles relax during sleep, allowing the tongue and other structures to collapse and block breathing.  There are several different tongue procedures available.  Selection of a tongue base procedure depends on characteristics seen on physical exam.  Generally, procedures are aimed at removing bulky tissues in this area or preventing the back of the tongue from falling back  during sleep.  Success rates for tongue surgery range from 50-62%3.    Hypoglossal Nerve Stimulation:  Hypoglossal nerve stimulation has recently received approval from the [place] Food and Drug Administration for the treatment of obstructive sleep apnea.  This is based on research showing that the system was safe and effective in treating sleep apnea6.  Results showed that the median AHI score decreased 68%, from 29.3 to 9.0. This therapy uses an implant system that senses breathing patterns and delivers mild stimulation to airway muscles, which keeps the airway open during sleep.  The system consists of three fully implanted components: a small generator (similar in size to a pacemaker), a breathing sensor, and a stimulation lead.  Using a small handheld remote, a patient turns the therapy on before bed and off upon awakening.    Candidates for this device must be greater than 22 years of age, have moderate to severe GUSTAVO (AHI between 20-65), BMI less than 32, have tried CPAP/oral appliance without success, and have appropriate upper airway anatomy (determined by a sleep endoscopy performed by Dr. Dueñas).    Hypoglossal Nerve Stimulation Pathway:    The sleep surgeon s office will work with the patient through the insurance prior-authorization process (including communications and appeals).    Nasal Procedures:  Nasal obstruction can interfere with nasal breathing during the day and night.  Studies have shown that relief of nasal obstruction can improve the ability of some patients to tolerate positive airway pressure therapy for obstructive sleep apnea1.  Treatment options include medications such as nasal saline, topical corticosteroid and antihistamine sprays, and oral medications such as antihistamines or decongestants. Non-surgical treatments can include external nasal dilators for selected patients. If these are not successful by themselves, surgery can improve the nasal airway either alone or in  combination with these other options.      Combination Procedures:  Combination of surgical procedures and other treatments may be recommended, particularly if patients have more than one area of narrowing or persistent positional disease.  The success rate of combination surgery ranges from 66-80%2,3.    References  1. Connor DOUGLAS. The Role of the Nose in Snoring and Obstructive Sleep Apnoea: An Update.  Eur Arch Otorhinolaryngol. 2011; 268: 1365-73.  2.  Linda SM; Catracho JA; Peter JR; Pallanch JF; Uri MB; Raysa SG; Carrie TURCIOS. Surgical modifications of the upper airway for obstructive sleep apnea in adults: a systematic review and meta-analysis. SLEEP 2010;33(10):0429-3783. Christiano BREWSTER. Hypopharyngeal surgery in obstructive sleep apnea: an evidence-based medicine review.  Arch Otolaryngol Head Neck Surg. 2006 Feb;132(2):206-13.  3. J Luis YH1, Binu Y, Bandar ROWAN. The efficacy of anatomically based multilevel surgery for obstructive sleep apnea. Otolaryngol Head Neck Surg. 2003 Oct;129(4):327-35.  4. Christiano BREWSTER, Goldberg A. Hypopharyngeal Surgery in Obstructive Sleep Apnea: An Evidence-Based Medicine Review. Arch Otolaryngol Head Neck Surg. 2006 Feb;132(2):206-13.  5. Conor ROPER et al. Upper-Airway Stimulation for Obstructive Sleep Apnea.  N Engl J Med. 2014 Jan 9;370(2):139-49.  6. Mayelin Y et al. Increased Incidence of Cardiovascular Disease in Middle-aged Men with Obstructive Sleep Apnea. Am J Respir Crit Care Med; 2002 166: 159-165  7. Cobb EM et al. Studying Life Effects and Effectiveness of Palatopharyngoplasty (SLEEP) study: Subjective Outcomes of Isolated Uvulopalatopharyngoplasty. Otolaryngol Head Neck Surg. 2011; 144: 623-631.

## 2020-10-21 NOTE — LETTER
"    10/21/2020         RE: Boris Berumen  [address] [place] [id number]        Dear Colleague,    Thank you for referring your patient, Boris Berumen, to the [facility]. Please see a copy of my visit note below.    Boris Berumen is a 35 year old male who is being evaluated via a billable video visit.      The patient has been notified of following:     \"This video visit will be conducted via a call between you and your physician/provider. We have found that certain health care needs can be provided without the need for an in-person physical exam.  This service lets us provide the care you need with a video conversation.  If a prescription is necessary we can send it directly to your pharmacy.  If lab work is needed we can place an order for that and you can then stop by our lab to have the test done at a later time.    Video visits are billed at different rates depending on your insurance coverage.  Please reach out to your insurance provider with any questions.    If during the course of the call the physician/provider feels a video visit is not appropriate, you will not be charged for this service.\"    Patient has given verbal consent for Video visit? Yes  How would you like to obtain your AVS? MyChart  If you are dropped from the video visit, the video invite should be resent to: Text to cell phone: [phone]  Will anyone else be joining your video visit? No        Video-Visit Details    Type of service:  Video Visit    Video Start Time: 10:23 AM  Video End Time: 10:59 AM    Originating Location (pt. Location): Home    Distant Location (provider location):  Home Office    Platform used for Video Visit: Deanne Mujica MD    Chief complaint:Consultation requested by Robert Elias MD for the evaluation of excessive daytime sleepiness and snoring    History of Present Illness: 35-year-old gentleman with history of ADHD on stimulants, chronic " allergies, loud snoring.  His snoring has worsened despite sleeping on his side and taking decongestants and antihistamines.  He has been getting elbowed a lot at night.  He has sleep disruption with frequent awakenings at night.  He does not necessarily need to go to the bathroom.  He fell asleep in a chair recently and his family members observed him to snore loudly and that it appeared that he was waking himself up frequently.  No clear observed apneas.  He reports being exhausted during the day.  He drinks a pot of coffee with the last caffeine around 4 PM usually.  He also takes stimulants twice a day.  Last at around noon.      He has a history of sleepwalking since childhood.  He has not left the house.  He has left the bedroom and is difficult to awaken.  There is been no injury to self or others.  He is not sure how often it is happening could be once a month or so.      He has frequent nocturnal reflux symptoms.  He does not wake up with headaches but has headaches during the day.  These have been increasing in frequency.  In the last 5 months sleep has been even more disrupted due to adopting of an infant.  His sleep schedule is irregular.  Typically going to bed anywhere from 8 PM to 2 AM.  Usually getting out of bed by 4 AM to 9 AM.      Total score - [place]: 10 (10/21/2020 10:00 AM) (Less than 10 normal)    BECKIE Total Score: 18 (normal 0-7, mild 8-14, moderate 15-21, severe 22-28)    STOP-BANG  Loud Snore   1  Excessively Tired/Sleepy   1  Observed apnea   0  Hypertension   0  BMI> 35 kg/m2   0  Age >50   0  Neck >16 in/40cm   1  (17 inches shirt collar)  Male Gender   1  Total =   4  (0-2 low, 3-4 intermediate, 5-8 high risk of GUSTAVO)      Past Medical History:   Diagnosis Date     Attention deficit hyperactivity disorder (ADHD), combined type 2002     Chronic migraine without aura without status migrainosus, not intractable        Allergies   Allergen Reactions     Mold Cough, Other (See Comments),  Itching and Shortness Of Breath     Pollen Extract Cough, Itching and Other (See Comments)       Current Outpatient Medications   Medication     amphetamine-dextroamphetamine (ADDERALL) 20 MG per tablet     ibuprofen (ADVIL/MOTRIN) 200 MG tablet     loratadine-pseudoePHEDrine (CLARITIN-D 12 HOUR) 5-120 MG per 12 hr tablet     melatonin 0.5 mg TABS half-tab     SUMAtriptan (IMITREX) 50 MG tablet     No current facility-administered medications for this visit.        Social History     Socioeconomic History     Marital status:      Spouse name: Not on file     Number of children: Not on file     Years of education: Not on file     Highest education level: Not on file   Occupational History     Not on file   Social Needs     Financial resource strain: Not on file     Food insecurity     Worry: Not on file     Inability: Not on file     Transportation needs     Medical: Not on file     Non-medical: Not on file   Tobacco Use     Smoking status: Current Some Day Smoker     Packs/day: 0.50     Start date: 1/1/2001     Smokeless tobacco: Never Used   Substance and Sexual Activity     Alcohol use: Yes     Frequency: 2-3 times a week     Drinks per session: 1 or 2     Comment: couple drinks per week socially     Drug use: No     Sexual activity: Not on file   Lifestyle     Physical activity     Days per week: Not on file     Minutes per session: Not on file     Stress: Not on file   Relationships     Social connections     Talks on phone: Not on file     Gets together: Not on file     Attends Temple service: Not on file     Active member of club or organization: Not on file     Attends meetings of clubs or organizations: Not on file     Relationship status: Not on file     Intimate partner violence     Fear of current or ex partner: Not on file     Emotionally abused: Not on file     Physically abused: Not on file     Forced sexual activity: Not on file   Other Topics Concern     Parent/sibling w/ CABG, MI or  angioplasty before 65F 55M? Not Asked   Social History Narrative     Not on file       Family History   Problem Relation Age of Onset     Sleep Apnea Mother      Sleep Apnea Father      Snoring Brother      Heart Disease No family hx of        Review of Systems: Positve per HPI, otherwise comprehensive review of systems is negative.    EXAM:  Last vitals /88 O2 sat 98% RA Wt 213 lbs BMI 32.5  GENERAL: Healthy, alert and no distress  EYES: Eyes grossly normal to inspection.  No discharge or erythema, or obvious scleral/conjunctival abnormalities.  RESP: No audible wheeze, cough, or visible cyanosis.  No visible retractions or increased work of breathing.    SKIN: Visible skin clear. No significant rash, abnormal pigmentation or lesions.  NEURO: Cranial nerves grossly intact.  Mentation and speech appropriate for age.  PSYCH: Mentation appears normal, affect normal/bright, judgement and insight intact, normal speech and appearance well-groomed.     TSH 11/6/2014 1.38 (N)    ASSESSMENT:  35-year-old gentleman with ADHD on stimulants, daytime sleepiness and loud disruptive snoring, nocturnal reflux, increasing frequency of migraines, weight gain, long history of sleepwalking.  He certainly has increased risk for obstructive sleep apnea which can aggravate sleepwalking in adult.  Irregular sleep schedule and excessive caffeine/stimulants can also contribute to sleep disruption and sleepwalking.    PLAN:  Extensive discussion regarding pathophysiology of obstructive sleep apnea and sleepwalking.  We reviewed the importance of treating sleep apnea due to his symptoms and to decrease cardiovascular risk.  Recommended testing.  Discussed home sleep apnea testing and in lab testing.  Recommended home sleep apnea testing at this time.  If he should have significant sleep apnea recommended a trial of CPAP it is as it is the fastest and most effective therapy.  Oral appliance therapy and weight management was also  "discussed.  Patient was encouraged to try to minimize fluctuations in his bedtime and rise time as much as possible.  Encouraged to monitor and minimize excessive caffeine.  Consider \"3 before 3\".  Also watch alcohol use and avoid more having more than 1.  Please see patient instructions for further details of counseling provided.    Michelle Mujica M.D.  Pulmonary/Critical Care/Sleep Medicine    [facility]   [address], [address]   [address] [place] [address]   Appointments: [phone]    The above note was dictated using voice recognition software and may include typographical errors. Please contact the author for any clarifications.                  Again, thank you for allowing me to participate in the care of your patient.        Sincerely,        Michelle Mujica MD  "

## 2020-10-26 ENCOUNTER — VIRTUAL VISIT (OUTPATIENT)
Dept: FAMILY MEDICINE | Facility: OTHER | Age: 35
End: 2020-10-26

## 2020-10-26 NOTE — PROGRESS NOTES
"Date: 10/26/2020 12:02:31  Clinician: Korey Matthew  Clinician NPI: [id number]  Patient: Boris Berumen  Patient : 1985  Patient Address: [address] [place] [address]  Patient Phone: [phone]  Visit Protocol: URI  Patient Summary:  Boris is a 35 year old ( : 1985 ) male who initiated a [facility] Visit for COVID-19 (Coronavirus) evaluation and screening. When asked the question \"Please sign me up to receive news, health information and promotions. \", Boris responded \"Yes\".    Boris states his symptoms started 1-2 days ago.   His symptoms consist of ear pain, enlarged lymph nodes, a cough, myalgia, malaise, and a sore throat.   Symptom details     Cough: Boris coughs a few times an hour and his cough is more bothersome at night. Phlegm does not come into his throat when he coughs. He does not believe his cough is caused by post-nasal drip.     Sore throat: Boris reports having mild throat pain (1-3 on a 10 point pain scale), does not have exudate on his tonsils, and can swallow liquids. The lymph nodes in his neck are enlarged. A rash has not appeared on the skin since the sore throat started.      Boris denies having headache, wheezing, fever, nasal congestion, anosmia, vomiting, rhinitis, nausea, facial pain or pressure, chills, teeth pain, ageusia, and diarrhea. He also denies taking antibiotic medication in the past month and having recent facial or sinus surgery in the past 60 days. He is not experiencing dyspnea.   Precipitating events  Boris is not sure if he has been exposed to someone with strep throat. He has not recently been exposed to someone with influenza. Boris has been in close contact with the following high risk individuals: adults 65 or older, immunocompromised people, and children under the age of 5.   Pertinent COVID-19 (Coronavirus) information  In the past 14 days, Boris has not worked in a congregate living setting.   He does not work or volunteer as healthcare worker or a "  and does not work or volunteer in a healthcare facility.   Boris also has not lived in a congregate living setting in the past 14 days. He does not live with a healthcare worker.   Boris has not had a close contact with a laboratory-confirmed COVID-19 patient within 14 days of symptom onset.   Since December 2019, Boris and has not had upper respiratory infection or influenza-like illness. Has not been diagnosed with lab-confirmed COVID-19 test   Pertinent medical history  Boris needs a return to work/school note.   Weight: 210 lbs   Boris smokes or uses smokeless tobacco.   Weight: 210 lbs    MEDICATIONS: Adderall oral, ALLERGIES: NKDA  Clinician Response:  Dear Boris,   Your symptoms show that you may have coronavirus (COVID-19). This illness can cause fever, cough and trouble breathing. Many people get a mild case and get better on their own. Some people can get very sick.  What should I do?  We would like to test you for this virus.   1. Please call [phone] to schedule your visit. Explain that you were referred by [facility] to have a COVID-19 test. Be ready to share your [facility] visit ID number.  Please note that if you are assessed for Covid-19 testing and receive an order for testing from [facility], that the scheduling of your Covid test at [facility] may be delayed by three or four days or more due to limited availability for testing. Additional options for testing can be found on the [place] Covid-19 Response website. https://[place].gov/covid19/    The following will serve as your written order for this COVID Test, ordered by me, for the indication of suspected COVID [Z20.828]: The test will be ordered in [employer], our electronic health record, after you are scheduled. It will show as ordered and authorized by Ketan Portillo MD.  Order: COVID-19 (Coronavirus) PCR for SYMPTOMATIC testing from [facility].   2. When it's time for your COVID test:  Stay at least 6 feet away from others. (If someone will drive  "you to your test, stay in the backseat, as far away from the  as you can.)   Cover your mouth and nose with a mask, tissue or washcloth.  Go straight to the testing site. Don't make any stops on the way there or back.      3.Starting now: Stay home and away from others (self-isolate) until:   You've had no fever---and no medicine that reduces fever---for one full day (24 hours). And...   Your other symptoms have gotten better. For example, your cough or breathing has improved. And...   At least 10 days have passed since your symptoms started.       During this time, don't leave the house except for testing or medical care.   Stay in your own room, even for meals. Use your own bathroom if you can.   Stay away from others in your home. No hugging, kissing or shaking hands. No visitors.  Don't go to work, school or anywhere else.    Clean \"high touch\" surfaces often (doorknobs, counters, handles, etc.). Use a household cleaning spray or wipes. You'll find a full list of  on the EPA website: www.epa.gov/pesticide-registration/list-n-disinfectants-use-against-sars-cov-2.   Cover your mouth and nose with a mask, tissue or washcloth to avoid spreading germs.  Wash your hands and face often. Use soap and water.  Caregivers in these groups are at risk for severe illness due to COVID-19:  o People 65 years and older  o People who live in a nursing home or long-term care facility  o People with chronic disease (lung, heart, cancer, diabetes, kidney, liver, immunologic)  o People who have a weakened immune system, including those who:   Are in cancer treatment  Take medicine that weakens the immune system, such as corticosteroids  Had a bone marrow or organ transplant  Have an immune deficiency  Have poorly controlled HIV or AIDS  Are obese (body mass index of 40 or higher)  Smoke regularly   o Caregivers should wear gloves while washing dishes, handling laundry and cleaning bedrooms and bathrooms.  o Use caution " when washing and drying laundry: Don't shake dirty laundry, and use the warmest water setting that you can.  o For more tips, go to www.cdc.gov/coronavirus/2019-ncov/downloads/10Things.pdf.       How can I take care of myself?   Get lots of rest. Drink extra fluids (unless a doctor has told you not to).   Take Tylenol (acetaminophen) for fever or pain. If you have liver or kidney problems, ask your family doctor if it's okay to take Tylenol.   Adults can take either:    650 mg (two 325 mg pills) every 4 to 6 hours, or...   1,000 mg (two 500 mg pills) every 8 hours as needed.    Note: Don't take more than 3,000 mg in one day. Acetaminophen is found in many medicines (both prescribed and over-the-counter medicines). Read all labels to be sure you don't take too much.   For children, check the Tylenol bottle for the right dose. The dose is based on the child's age or weight.    If you have other health problems (like cancer, heart failure, an organ transplant or severe kidney disease): Call your specialty clinic if you don't feel better in the next 2 days.       Know when to call 911. Emergency warning signs include:    Trouble breathing or shortness of breath Pain or pressure in the chest that doesn't go away Feeling confused like you haven't felt before, or not being able to wake up Bluish-colored lips or face.  Where can I get more information?   [facility] -- About COVID-19: [email or web address]   CDC -- What to Do If You're Sick: www.cdc.gov/coronavirus/2019-ncov/about/steps-when-sick.html   CDC -- Ending Home Isolation: www.cdc.gov/coronavirus/2019-ncov/hcp/disposition-in-home-patients.html   CDC -- Caring for Someone: www.cdc.gov/coronavirus/2019-ncov/if-you-are-sick/care-for-someone.html   [facility] -- Interim Guidance for Hospital Discharge to Home: www.health.[facility].[place]/diseases/coronavirus/hcp/hospdischarge.pdf   [facility] clinical trials (COVID-19 research studies):  clinicalaffairs.[facility]-clinical-trials    Below are the COVID-19 hotlines at the [place] Department of Health ([facility]). Interpreters are available.    For health questions: Call 315-659-8129 or 1-987.381.6776 (7 a.m. to 7 p.m.) For questions about schools and childcare: Call 009-128-7425 or 1-301.955.1640 (7 a.m. to 7 p.m.)    Diagnosis: Contact with and (suspected) exposure to other viral communicable diseases  Diagnosis ICD: Z20.828

## 2020-10-30 ENCOUNTER — VIRTUAL VISIT (OUTPATIENT)
Dept: FAMILY MEDICINE | Facility: OTHER | Age: 35
End: 2020-10-30

## 2020-10-30 NOTE — PROGRESS NOTES
"Date: 10/30/2020 12:23:01  Clinician: Abril Lawson  Clinician NPI: [id number]  Patient: Boris Berumen  Patient : 1985  Patient Address: [address] [place] [address]  Patient Phone: [phone]  Visit Protocol: URI  Patient Summary:  Boris is a 35 year old ( : 1985 ) male who initiated a [facility] Visit for cold, sinus infection, or influenza. When asked the question \"Please sign me up to receive news, health information and promotions. \", Boris responded \"No\".    Boris states his symptoms started suddenly 5-6 days ago.   His symptoms consist of ear pain, wheezing, a cough, myalgia, and malaise.   Symptom details     Cough: Boris coughs a few times an hour and his cough is more bothersome at night. Phlegm comes into his throat when he coughs. He believes his cough is caused by post-nasal drip. The color of the phlegm is yellow.     Wheezing: Boris has not ever been diagnosed with asthma. Additional wheezing details as reported by the patient (free text): When I take a deep breath, I wheeze then feel a tickle in my throat and chest. Then I cough with phlegm. I also have some muffled hearing in my left hear and have noticed very dark colored earwax. The ear symptoms have been present for nearly 2 weeks while the respiratory symptoms are only 6 days old(Oct 25).    C19 test was administered on oct 26, results came back negative on Oct 28/29th.    I think I have an ear infection or sinus infection.        Boris denies having headache, fever, nasal congestion, nausea, facial pain or pressure, chills, sore throat, teeth pain, ageusia, diarrhea, anosmia, vomiting, and rhinitis. He also denies having recent facial or sinus surgery in the past 60 days, double sickening (worsening symptoms after initial improvement), and taking antibiotic medication in the past month. He is not experiencing dyspnea.   Precipitating events  He has not recently been exposed to someone with influenza. Boris has been in close " contact with the following high risk individuals: children under the age of 5, immunocompromised people, and adults 65 or older.   Pertinent COVID-19 (Coronavirus) information  Boris does not work or volunteer as healthcare worker or a . In the past 14 days, Boris has not worked or volunteered at a healthcare facility or group living setting.   In the past 14 days, he also has not lived in a congregate living setting.   Boris has not had a close contact with a laboratory-confirmed COVID-19 patient within 14 days of symptom onset.    Since December 2019, Boris has not been diagnosed with lab-confirmed COVID-19 test and has had upper respiratory infection (URI) or influenza-like illness.      Date(s) of previous URI or influenza-like illness (free-text): February 10th 2020     Symptoms Boris experienced during previous URI or influenza-like illness as reported by the patient (free-text): Extreme Fatigue  Cough   Fever like symptoms but not a fever   Aches  Chills   Diarrhea     Diagnosed with Influenza B        Pertinent medical history  Boris needs a return to work/school note.   Weight: 217 lbs   Boris smokes or uses smokeless tobacco.   Additional information as reported by the patient (free text): C19 test administered on Oct 26, results negative on Oct 28th     I have some muffled hearing in my left hear and have noticed very dark colored earwax. On two occasions I have found ear wax that's yellow with portions of black black wax intermixed. The ear symptoms have been present for 14 while the respiratory symptoms are only 6 days old(Oct 25).    I think I have an ear infection or sinus infection. Can I get a prescription for an antibiotic?   Weight: 217 lbs    MEDICATIONS: Advil oral, Claritin-D 12 Hour oral, Tylenol oral, Adderall oral, ALLERGIES: NKDA  Clinician Response:  Dear Boris,  I am sorry you are not feeling well. To determine the most appropriate care for you, I would like you to be seen in  person to further discuss your health history and symptoms.  You will not be charged for this [facility] Visit. Thank you for trusting us with your care.  COVID-19 (Coronavirus) General Information  Because there is currently no vaccine to prevent infection, the best way to protect yourself is to avoid being exposed to this virus. Common symptoms of COVID-19 include but are not limited to fever, cough, and shortness of breath. These symptoms appear 2-14 days after you are exposed to the virus that causes COVID-19. Click here for more information from the CDC on how to protect yourself.  If you are sick with COVID-19 or suspect you are infected with the virus that causes COVID-19, follow the steps here from the CDC to help prevent the disease from spreading to people in your home and community.  Click here for general information from the CDC on testing.  If you develop any of these emergency warning signs for COVID-19, get medical attention immediately:     Trouble breathing    Persistent pain or pressure in the chest    New confusion or inability to arouse    Bluish lips or face      Call your doctor or clinic before going in. Call 911 if you have a medical emergency and notify the  you have or think you may have COVID-19.  For more detailed and up to date information on COVID-19 (Coronavirus), please visit the CDC website.   Diagnosis: Refer for additional evaluation  Diagnosis ICD: R69

## 2020-11-07 ENCOUNTER — HEALTH MAINTENANCE LETTER (OUTPATIENT)
Age: 35
End: 2020-11-07

## 2020-11-18 ENCOUNTER — TELEPHONE (OUTPATIENT)
Dept: FAMILY MEDICINE | Facility: CLINIC | Age: 35
End: 2020-11-18

## 2020-11-18 ENCOUNTER — MYC MEDICAL ADVICE (OUTPATIENT)
Dept: FAMILY MEDICINE | Facility: CLINIC | Age: 35
End: 2020-11-18

## 2020-11-18 ENCOUNTER — OFFICE VISIT (OUTPATIENT)
Dept: FAMILY MEDICINE | Facility: CLINIC | Age: 35
End: 2020-11-18
Payer: COMMERCIAL

## 2020-11-18 VITALS
SYSTOLIC BLOOD PRESSURE: 118 MMHG | WEIGHT: 214 LBS | DIASTOLIC BLOOD PRESSURE: 85 MMHG | TEMPERATURE: 97.8 F | HEIGHT: 68 IN | HEART RATE: 104 BPM | BODY MASS INDEX: 32.43 KG/M2

## 2020-11-18 DIAGNOSIS — H65.02 NON-RECURRENT ACUTE SEROUS OTITIS MEDIA OF LEFT EAR: Primary | ICD-10-CM

## 2020-11-18 PROCEDURE — 99213 OFFICE O/P EST LOW 20 MIN: CPT | Mod: 25 | Performed by: NURSE PRACTITIONER

## 2020-11-18 PROCEDURE — 90471 IMMUNIZATION ADMIN: CPT | Performed by: NURSE PRACTITIONER

## 2020-11-18 PROCEDURE — 90686 IIV4 VACC NO PRSV 0.5 ML IM: CPT | Performed by: NURSE PRACTITIONER

## 2020-11-18 RX ORDER — AMOXICILLIN 500 MG/1
500 CAPSULE ORAL 3 TIMES DAILY
Qty: 30 CAPSULE | Refills: 0 | Status: SHIPPED | OUTPATIENT
Start: 2020-11-18 | End: 2021-06-29

## 2020-11-18 RX ORDER — NEOMYCIN SULFATE, POLYMYXIN B SULFATE AND HYDROCORTISONE 10; 3.5; 1 MG/ML; MG/ML; [USP'U]/ML
3 SUSPENSION/ DROPS AURICULAR (OTIC) 4 TIMES DAILY
Qty: 10 ML | Refills: 0 | Status: SHIPPED | OUTPATIENT
Start: 2020-11-18 | End: 2021-06-29

## 2020-11-18 ASSESSMENT — MIFFLIN-ST. JEOR: SCORE: 1870.2

## 2020-11-18 NOTE — TELEPHONE ENCOUNTER
Forwarding to provider per patient's request-he also called the clinic-that encounter closed to avoid duplication.    Negin Dougherty RN

## 2020-11-18 NOTE — TELEPHONE ENCOUNTER
Patient reports he sent a MyChart and just wants that to get to Bonnie Flores as f/u to his appt today.  RN will close this encounter and address MyChart encounter to avoid duplication.      Negin Dougherty RN

## 2020-11-18 NOTE — PROGRESS NOTES
"Subjective     Boris Berumen is a 35 year old male who presents to clinic today for the following health issues:    HPI         ED/UC Followup:    Facility:  [facility]   Date of visit: 10/30/2020  Reason for visit: ear problem  Current Status: not getting better, right ear drainage    She was seen at urgent care and had a water irrigation of his left ear.  It did help for some time but then he developed pain in the inner ear canal and creamy whitish drainage.  No fever no lymphadenopathy.  History of ear infections as a child.         Review of Systems   Constitutional, HEENT, cardiovascular, pulmonary, GI, , musculoskeletal, neuro, skin, endocrine and psych systems are negative, except as otherwise noted.      Objective    /85   Pulse 104   Temp 97.8  F (36.6  C) (Oral)   Ht 1.727 m (5' 8\")   Wt 97.1 kg (214 lb)   BMI 32.54 kg/m    There is no height or weight on file to calculate BMI.  Physical Exam   GENERAL: healthy, alert and no distress  EYES: Eyes grossly normal to inspection, PERRL and conjunctivae and sclerae normal  HENT: normal cephalic/atraumatic, left ear: bulging membrane and purulent drainage in canal, nose and mouth without ulcers or lesions, oropharynx clear and oral mucous membranes moist  NECK: no adenopathy, no asymmetry, masses, or scars and thyroid normal to palpation  RESP: lungs clear to auscultation - no rales, rhonchi or wheezes  CV: regular rate and rhythm, normal S1 S2, no S3 or S4, no murmur, click or rub, no peripheral edema and peripheral pulses strong  ABDOMEN: soft, nontender, no hepatosplenomegaly, no masses and bowel sounds normal  MS: no gross musculoskeletal defects noted, no edema  SKIN: no suspicious lesions or rashes  NEURO: Normal strength and tone, mentation intact and speech normal  PSYCH: mentation appears normal, affect normal/bright    No results found for this or any previous visit (from the past 24 hour(s)).    "     Assessment & Plan     Non-recurrent acute serous otitis media of left ear  Treating patient with amoxicillin and fluoroquinolone to cover for atypical bacteria  - amoxicillin (AMOXIL) 500 MG capsule; Take 1 capsule (500 mg) by mouth 3 times daily  - neomycin-polymyxin-hydrocortisone (CORTISPORIN) 3.5-14239-4 otic suspension; Place 3 drops Into the left ear 4 times daily     Tobacco Cessation:   reports that he has been smoking cigarettes. He started smoking about 19 years ago. He has a 2.50 pack-year smoking history. He has never used smokeless tobacco.  Tobacco Cessation Action Plan: Information offered: Patient not interested at this time         Return in about 3 months (around 2/18/2021), or if symptoms worsen or fail to improve.    CARLOS Ahuja CNP [facility]

## 2020-11-18 NOTE — TELEPHONE ENCOUNTER
Reason for Call:  Other call back    Detailed comments: Patient is requesting a call back from Bonnie Flores NP.  He wanted to clarify some information.    Phone Number Patient can be reached at:   [phone]    Best Time: any   Can we leave a detailed message on this number? YES    Call taken on 11/18/2020 at 1:32 PM by Elo Carreno

## 2020-11-23 ENCOUNTER — OFFICE VISIT (OUTPATIENT)
Dept: SLEEP MEDICINE | Facility: CLINIC | Age: 35
End: 2020-11-23
Payer: COMMERCIAL

## 2020-11-23 DIAGNOSIS — G47.33 OSA (OBSTRUCTIVE SLEEP APNEA): ICD-10-CM

## 2020-11-23 DIAGNOSIS — G47.9 SLEEP DISTURBANCE: ICD-10-CM

## 2020-11-23 DIAGNOSIS — G47.19 EXCESSIVE DAYTIME SLEEPINESS: ICD-10-CM

## 2020-11-23 DIAGNOSIS — F51.3 SLEEP WALKING DISORDER: ICD-10-CM

## 2020-11-23 DIAGNOSIS — R06.83 LOUD SNORING: ICD-10-CM

## 2020-11-23 DIAGNOSIS — G43.909 MIGRAINE WITHOUT STATUS MIGRAINOSUS, NOT INTRACTABLE, UNSPECIFIED MIGRAINE TYPE: ICD-10-CM

## 2020-11-23 PROCEDURE — G0399 HOME SLEEP TEST/TYPE 3 PORTA: HCPCS | Performed by: INTERNAL MEDICINE

## 2020-11-24 ENCOUNTER — DOCUMENTATION ONLY (OUTPATIENT)
Dept: SLEEP MEDICINE | Facility: CLINIC | Age: 35
End: 2020-11-24
Payer: COMMERCIAL

## 2020-11-25 NOTE — PROGRESS NOTES
This HSAT was performed using a Noxturnal T3 device which recorded snore, sound, movement activity, body position, nasal pressure, oronasal thermal airflow, pulse, oximetry and both chest and abdominal respiratory effort. HSAT data was restricted to the time patient states they were in bed.     HSAT was scored using 1B 4% hypopnea rule.     HST AHI (Non-PAT): 26.7  Snoring was reported as loud.  Time with SpO2 below 89% was 13 minutes.   Overall signal quality was good     Pt will follow up with sleep provider to determine appropriate therapy.   HST POST-STUDY QUESTIONNAIRE    1. What time did you go to bed?  midnight  2. How long do you think it took to fall asleep?  60 min  3. What time did you wake up to start the day?  730-800  4. Did you get up during the night at all?  yes  5. If you woke up, do you remember approximately what time(s)? 115  6. Did you have any difficulty with the equipment?  No  7. Did you us any type of treatment with this study?  None  8. Was the head of the bed elevated? No  9. Did you sleep in a recliner?  No  10. Did you stop using CPAP at least 3 days before this test?  NA  11. Any other information you'd like us to know? Slept on couch

## 2020-11-27 PROBLEM — G47.33 OSA (OBSTRUCTIVE SLEEP APNEA): Status: ACTIVE | Noted: 2020-11-27

## 2020-11-27 NOTE — PROCEDURES
"HOME SLEEP STUDY INTERPRETATION    Patient: Boris Berumen  MRN: [id number]  YOB: 1985  Study Date: 2020  Referring Provider: Robert Elias MD  Ordering Provider: Michelle Mujica MD     Indications for Home Study: Boris Berumen is a 35 year old male with a history of ADHD on stimulants, migraines, sleep walking, who presents with symptoms suggestive of obstructive sleep apnea.    Estimated body mass index is 32.54 kg/m  as calculated from the following:    Height as of 20: 1.727 m (5' 8\").    Weight as of 20: 97.1 kg (214 lb).  Total score - [place]: 10 (10/21/2020 10:00 AM)  STOP-BAN/8    Data: A full night home sleep study was performed recording the standard physiologic parameters including body position, movement, sound, nasal pressure, thermal oral airflow, chest and abdominal movements with respiratory inductance plethysmography, and oxygen saturation by pulse oximetry. Pulse rate was estimated by oximetry recording. This study was considered adequate based on > 4 hours of quality oximetry and respiratory recording. As specified by the AASM Manual for the Scoring of Sleep and Associated events, version 2.3, Rule VIII.D 1B, 4% oxygen desaturation scoring for hypopneas is used as a standard of care on all home sleep apnea testing.    Analysis Time:  432 minutes    Respiration:   Sleep Associated Hypoxemia: sustained hypoxemia was present. Baseline oxygen saturation was 93%.  Time with saturation less than or equal to 88% was 13 minutes. The lowest oxygen saturation was 83%.   Snoring: Snoring was present.  Respiratory events: The home study revealed a presence of 88 obstructive apneas and 10 mixed and central apneas. There were 94 hypopneas resulting in a combined apnea/hypopnea index [AHI] of 26.7 events per hour.  AHI was 53.5 per hour supine, NA per hour prone, 4.9 per hour on left side, and 8.5 per hour on right side.   Pattern: Excluding events noted above, " respiratory rate and pattern was Normal.    Position: Percent of time spent: supine - 41%, prone - 0%, on left - 12%, on right - 47%.    Heart Rate: By pulse oximetry normal rate was noted.     Assessment:   Moderate obstructive sleep apnea.  Sleep associated hypoxemia was present.    Recommendations:  Consider auto-CPAP at 5-15 cmH2O or oral appliance therapy.  Suggest optimizing sleep hygiene and avoiding sleep deprivation.  Weight management.    Diagnosis Code(s): Obstructive Sleep Apnea G47.33, Hypoxemia G47.36    Michelle Mujica MD, November 27, 2020   Diplomate, American Board of Internal Medicine, Sleep Medicine

## 2020-12-07 ENCOUNTER — TELEPHONE (OUTPATIENT)
Dept: SLEEP MEDICINE | Facility: CLINIC | Age: 35
End: 2020-12-07

## 2020-12-10 ENCOUNTER — TELEPHONE (OUTPATIENT)
Dept: SLEEP MEDICINE | Facility: CLINIC | Age: 35
End: 2020-12-10

## 2021-02-18 ENCOUNTER — MYC MEDICAL ADVICE (OUTPATIENT)
Dept: FAMILY MEDICINE | Facility: CLINIC | Age: 36
End: 2021-02-18

## 2021-02-18 ENCOUNTER — OFFICE VISIT (OUTPATIENT)
Dept: URGENT CARE | Facility: URGENT CARE | Age: 36
End: 2021-02-18
Attending: NURSE PRACTITIONER
Payer: COMMERCIAL

## 2021-02-18 ENCOUNTER — E-VISIT (OUTPATIENT)
Dept: FAMILY MEDICINE | Facility: CLINIC | Age: 36
End: 2021-02-18
Payer: COMMERCIAL

## 2021-02-18 DIAGNOSIS — R68.89 FLU-LIKE SYMPTOMS: Primary | ICD-10-CM

## 2021-02-18 DIAGNOSIS — R68.89 FLU-LIKE SYMPTOMS: ICD-10-CM

## 2021-02-18 PROCEDURE — U0005 INFEC AGEN DETEC AMPLI PROBE: HCPCS | Performed by: NURSE PRACTITIONER

## 2021-02-18 PROCEDURE — U0003 INFECTIOUS AGENT DETECTION BY NUCLEIC ACID (DNA OR RNA); SEVERE ACUTE RESPIRATORY SYNDROME CORONAVIRUS 2 (SARS-COV-2) (CORONAVIRUS DISEASE [COVID-19]), AMPLIFIED PROBE TECHNIQUE, MAKING USE OF HIGH THROUGHPUT TECHNOLOGIES AS DESCRIBED BY CMS-2020-01-R: HCPCS | Performed by: NURSE PRACTITIONER

## 2021-02-18 PROCEDURE — 99421 OL DIG E/M SVC 5-10 MIN: CPT | Performed by: NURSE PRACTITIONER

## 2021-02-18 NOTE — LETTER
[facility]  [address] [address]  Phone: [phone]    February 18, 2021        Boris Berumen  [address] [address]          To whom it may concern:    RE: Boris Berumen    Patient was seen and treated today at our clinic.  Boris Berumen has been evaluated and needs to remain out of work to isolate for 10 days from when symptoms started AND 72 hours after fever resolves (without fever reducing medications) AND improvement of respiratory symptoms (whichever is longer).        Please contact me for questions or concerns.      Sincerely,        CARLOS Ahuja CNP

## 2021-02-18 NOTE — TELEPHONE ENCOUNTER
Joceline way sent.  Morenita Patel RN  [facility]: [place]  Phone: [phone]  Fax:[phone]

## 2021-02-19 LAB
SARS-COV-2 RNA RESP QL NAA+PROBE: NOT DETECTED
SPECIMEN SOURCE: NORMAL

## 2021-04-28 ENCOUNTER — TRANSFERRED RECORDS (OUTPATIENT)
Dept: HEALTH INFORMATION MANAGEMENT | Facility: CLINIC | Age: 36
End: 2021-04-28

## 2021-06-29 ENCOUNTER — VIRTUAL VISIT (OUTPATIENT)
Dept: FAMILY MEDICINE | Facility: CLINIC | Age: 36
End: 2021-06-29
Payer: COMMERCIAL

## 2021-06-29 DIAGNOSIS — F90.9 ATTENTION DEFICIT HYPERACTIVITY DISORDER (ADHD), UNSPECIFIED ADHD TYPE: ICD-10-CM

## 2021-06-29 DIAGNOSIS — G43.109 MIGRAINE WITH AURA AND WITHOUT STATUS MIGRAINOSUS, NOT INTRACTABLE: Primary | ICD-10-CM

## 2021-06-29 PROCEDURE — 99214 OFFICE O/P EST MOD 30 MIN: CPT | Mod: GT | Performed by: NURSE PRACTITIONER

## 2021-06-29 RX ORDER — SUMATRIPTAN 50 MG/1
50 TABLET, FILM COATED ORAL
Qty: 20 TABLET | Refills: 3 | Status: SHIPPED | OUTPATIENT
Start: 2021-06-29

## 2021-06-29 RX ORDER — DEXTROAMPHETAMINE SACCHARATE, AMPHETAMINE ASPARTATE, DEXTROAMPHETAMINE SULFATE AND AMPHETAMINE SULFATE 5; 5; 5; 5 MG/1; MG/1; MG/1; MG/1
20 TABLET ORAL 3 TIMES DAILY
COMMUNITY
Start: 2021-06-29 | End: 2021-07-19

## 2021-06-29 NOTE — PROGRESS NOTES
Boris is a 35 year old who is being evaluated via a billable video visit.      How would you like to obtain your AVS? MyChart  If the video visit is dropped, the invitation should be resent by: Text to cell phone: [phone]  Will anyone else be joining your video visit? No      Video Start Time: 1339    Assessment & Plan     Migraine with aura and without status migrainosus, not intractable  Stable. imitrex refilled. Encouraged pt to schedule a routine eye exam as hes over due.   - SUMAtriptan (IMITREX) 50 MG tablet; Take 1 tablet (50 mg) by mouth at onset of headache for migraine May repeat in 2 hours. Max 4 tablets/24 hours.    Attention deficit hyperactivity disorder (ADHD), unspecified ADHD type   website checked. Referral placed to psychiatry however we discussed how it may take awhile to get in to psychiatry. Encouraged pt to check with his insurance to see if there are other psychiatrists within his network which he may be able to get in sooner if it's a long wait through [facility], he should send a [facility] [employer] chart message with this information so alternative referral can be placed. Discussed with pt that this writer would temporarily refill adderall while he awaits getting in to psychiatry   - MENTAL HEALTH REFERRAL  - Adult; Psychiatry; Psychiatry; [facility]: Collaborative Care Psychiatry Service/Bridge to Long-Term Psychiatry as indicated ([phone]); Yes; Other: Enter in Comments box; Yes; We will contact you to schedule the appointme...  - amphetamine-dextroamphetamine (ADDERALL) 20 MG tablet; Take 1 tablet (20 mg) by mouth 3 times daily      Return in about 3 months (around 9/29/2021) for Medication Follow up Visit.    Nicci Prieto NP  [facility]    Justin Escalante is a 35 year old who presents for the following health issues     HPI     Migraine     Since your last clinic visit, how have your headaches changed?  No change    How often are you getting headaches or  "migraines? 1-2/month     Are you able to do normal daily activities when you have a migraine? No    Are you taking rescue/relief medications? (Select all that apply) sumatriptan (Imitrex)    How helpful is your rescue/relief medication?  I get some relief    Are you taking any medications to prevent migraines? (Select all that apply)  No    In the past 4 weeks, how often have you gone to urgent care or the emergency room because of your headaches?  0      How many servings of fruits and vegetables do you eat daily?  2-3    On average, how many sweetened beverages do you drink each day (Examples: soda, juice, sweet tea, etc.  Do NOT count diet or artificially sweetened beverages)?   0-1    How many days per week do you exercise enough to make your heart beat faster? 3 or less    How many minutes a day do you exercise enough to make your heart beat faster? 9 or less    How many days per week do you miss taking your medication? 0    He states that his migraines come in groups of 2-3 per week and are usually 4-6 weeks apart. He states he can tell a migraine coming on when he has \"head haze,\" slight throb and tingling sensation and pressure in his eyes. Migraines began when in college. He has a history of concussions. He states typically when symptoms start he increases his water intake, takes excedrin, lays down and turns the light off which tends to help. If symptoms persist/worsen he will take 1 imitrex. He states his last eye exam was years ago.     hes requesting a referral to psychiatry for management of his ADHD as his last provider retired. He states he has switched from adderall 30mg BID to adderall 20mg TID which tends to work well for him. He states the extended release causes him to be too awake at night. He denies drug use. Reports infrequent alcohol intake. He states sleep is okay with current dosing. He states he is working around 60 hours per week. He states hes not due for a refill until middle of July. "     He states he typically takes adderall: 20mg around 0530, 10mg around 0830, 10mg around 1130, and 20mg around 1400. He states insurance would require a prior auth for this prescriptions so he started using goodrx.       Review of Systems   Constitutional, HEENT, cardiovascular, pulmonary, gi and gu systems are negative, except as otherwise noted.      Objective           Vitals:  No vitals were obtained today due to virtual visit.    Physical Exam   GENERAL: Healthy, alert and no distress  EYES: Eyes grossly normal to inspection.  No discharge or erythema, or obvious scleral/conjunctival abnormalities.  RESP: No audible wheeze, cough, or visible cyanosis.  No visible retractions or increased work of breathing.    SKIN: Visible skin clear. No significant rash, abnormal pigmentation or lesions.  NEURO: Cranial nerves grossly intact.  Mentation and speech appropriate for age.  PSYCH: Mentation appears normal, affect normal/bright, judgement and insight intact, normal speech and appearance well-groomed.      Video-Visit Details    Type of service:  Video Visit    Video End Time: 1358    Originating Location (pt. Location): Home    Distant Location (provider location):  [facility]     Platform used for Video Visit: Faraday

## 2021-07-07 ENCOUNTER — TELEPHONE (OUTPATIENT)
Dept: SLEEP MEDICINE | Facility: CLINIC | Age: 36
End: 2021-07-07

## 2021-07-09 ENCOUNTER — DOCUMENTATION ONLY (OUTPATIENT)
Dept: SLEEP MEDICINE | Facility: CLINIC | Age: 36
End: 2021-07-09

## 2021-07-13 ENCOUNTER — DOCUMENTATION ONLY (OUTPATIENT)
Dept: SLEEP MEDICINE | Facility: CLINIC | Age: 36
End: 2021-07-13
Payer: COMMERCIAL

## 2021-07-13 NOTE — PROGRESS NOTES
Patient was offered choice of vendor and chose [facility].  Patient Boris Berumen was set up at [place] on July 13, 2021. Patient received a Resmed AirSense 10 Auto. Pressures were set at 5-15 cm H2O.   Patient s ramp is 5 cm H2O for Auto and FLEX/EPR is EPR, 2.  Patient received a Resmed Mask name: AIRFIT N20  Nasal mask size Large, heated tubing and heated humidifier.  Patient does not need to meet compliance.  Connor Yepez

## 2021-07-16 ENCOUNTER — DOCUMENTATION ONLY (OUTPATIENT)
Dept: SLEEP MEDICINE | Facility: CLINIC | Age: 36
End: 2021-07-16
Payer: COMMERCIAL

## 2021-07-16 NOTE — PROGRESS NOTES
3 day Sleep therapy management telephone visit    Diagnostic AHI:  26.7 HST    Confirmed with patient at time of call- N/A Patient is still interested in STM service       Message left for patient to return call        Objective data     Order Settings for PAP  CPAP min 5    CPAP max 15        Device settings from machine CPAP min 5.0     CPAP max 15.0      EPR Setting TWO    RESMED soft response  OFF     Assessment: Minimal usage      Action plan: Patient to have 14 day STM visit. Patient has a follow up visit scheduled:   no    Replacement device: No  STM ordered by provider: Yes     Total time spent on accessing and  interpreting remote patient PAP therapy data  10 minutes    Total time spent counseling, coaching  and reviewing PAP therapy data with patient  1 minutes    [id number] no

## 2021-07-19 ENCOUNTER — MYC MEDICAL ADVICE (OUTPATIENT)
Dept: FAMILY MEDICINE | Facility: CLINIC | Age: 36
End: 2021-07-19

## 2021-07-19 DIAGNOSIS — F90.9 ATTENTION DEFICIT HYPERACTIVITY DISORDER (ADHD), UNSPECIFIED ADHD TYPE: ICD-10-CM

## 2021-07-19 RX ORDER — DEXTROAMPHETAMINE SACCHARATE, AMPHETAMINE ASPARTATE, DEXTROAMPHETAMINE SULFATE AND AMPHETAMINE SULFATE 5; 5; 5; 5 MG/1; MG/1; MG/1; MG/1
20 TABLET ORAL 3 TIMES DAILY
Qty: 90 TABLET | Refills: 0 | Status: SHIPPED | OUTPATIENT
Start: 2021-07-19 | End: 2021-08-19

## 2021-07-19 NOTE — TELEPHONE ENCOUNTER
Routing [place] message to Nicci Prieto, who last saw patient 6/29/21 for virtual visit.     Per notes: Attention deficit hyperactivity disorder (ADHD), unspecified ADHD type   website checked. Referral placed to psychiatry however we discussed how it may take awhile to get in to psychiatry. Encouraged pt to check with his insurance to see if there are other psychiatrists within his network which he may be able to get in sooner if it's a long wait through [place], he should send a my chart message with this information so alternative referral can be placed. Discussed with pt that this writer would temporarily refill adderall while he awaits getting in to psychiatry     Routed to provider to review and advise.     Filomena Quinn, RN, BSN, PHN  [facility]: [place]

## 2021-07-20 NOTE — TELEPHONE ENCOUNTER
Patient notified.    Thank you,  Marianela SHANNON  [facility]  Team [place]ol[place] Coordinator

## 2021-07-28 ENCOUNTER — DOCUMENTATION ONLY (OUTPATIENT)
Dept: SLEEP MEDICINE | Facility: CLINIC | Age: 36
End: 2021-07-28

## 2021-07-28 NOTE — PROGRESS NOTES
14  DAY STM VISIT    Diagnostic AHI:   26.7  HST    Subjective measures:   Patient reports he feels like the pressure is too high.  He does unplug the device nightly so we are not getting data.     Assessment: Pt not meeting objective benchmarks for compliance, however patient reports nightly usage no data due to machine being unplugged.   Patient failing following subjective benchmarks: pressure issues    Action plan: pt to have 30 day STM visit. Changed ramp to 4.0 cm h20 remotely.  Pt advised to leave device plugged in so we can get the changes made.       Device type: Auto-CPAP    PAP settings: CPAP min 5.0 cm  H20       CPAP max 15.0 cm  H20            RESMED EPR level Setting: TWO    RESMED Soft response setting:  OFF    Mask type:  Nasal Mask    Objective measures: 14 day rolling measures one night of usage on 7/13/21 no data since then.            Objective measure goal  Compliance   Goal >70%  Leak   Goal < 24 lpm  AHI  Goal < 5  Usage  Goal >240        Total time spent on accessing and interpreting remote patient PAP therapy data  10 minutes    Total time spent counseling, coaching  and reviewing PAP therapy data with patient  4 minutes    [id number]  [id number]  no (3 day STM)

## 2021-08-16 ENCOUNTER — DOCUMENTATION ONLY (OUTPATIENT)
Dept: SLEEP MEDICINE | Facility: CLINIC | Age: 36
End: 2021-08-16

## 2021-08-16 NOTE — PROGRESS NOTES
30 DAY [facility] VISIT    Diagnostic AHI:  26.7 HST    Message left for patient to return call     Action plan: waiting for patient to return call.   Patient has not scheduled a follow up visit .   Device type: Auto-CPAP  PAP settings: CPAP min 5.0 cm  H20     CPAP max 15.0 cm  H20     RESMED EPR level Setting: TWO    RESMED Soft response setting:  OFF  Mask type:  Nasal Mask  Objective measures: 14 day rolling measures     Objective measure goal  Compliance   Goal >70%  Leak   Goal < 24 lpm  AHI  Goal < 5  Usage  Goal >240        Total time spent on accessing and interpreting remote patient PAP therapy data  2 minutes    Total time spent counseling, coaching  and reviewing PAP therapy data with patient  0 minutes     [id number] this call  [id number] no  at 3 or 14 day [facility]

## 2021-08-19 ENCOUNTER — MYC MEDICAL ADVICE (OUTPATIENT)
Dept: FAMILY MEDICINE | Facility: CLINIC | Age: 36
End: 2021-08-19

## 2021-08-19 DIAGNOSIS — F90.9 ATTENTION DEFICIT HYPERACTIVITY DISORDER (ADHD), UNSPECIFIED ADHD TYPE: ICD-10-CM

## 2021-08-19 RX ORDER — DEXTROAMPHETAMINE SACCHARATE, AMPHETAMINE ASPARTATE, DEXTROAMPHETAMINE SULFATE AND AMPHETAMINE SULFATE 5; 5; 5; 5 MG/1; MG/1; MG/1; MG/1
20 TABLET ORAL 3 TIMES DAILY
Qty: 90 TABLET | Refills: 0 | Status: SHIPPED | OUTPATIENT
Start: 2021-08-19 | End: 2021-09-21

## 2021-08-19 NOTE — TELEPHONE ENCOUNTER
website checked. Medication refilled. Encourage pt to have prior records sent to clinic for review. Encourage pt to contact providers that he received referrals for to set up an appointment with psychiatry

## 2021-09-05 ENCOUNTER — HEALTH MAINTENANCE LETTER (OUTPATIENT)
Age: 36
End: 2021-09-05

## 2021-09-17 RX ORDER — DEXTROAMPHETAMINE SACCHARATE, AMPHETAMINE ASPARTATE, DEXTROAMPHETAMINE SULFATE AND AMPHETAMINE SULFATE 5; 5; 5; 5 MG/1; MG/1; MG/1; MG/1
20 TABLET ORAL 3 TIMES DAILY
Qty: 90 TABLET | Refills: 0 | OUTPATIENT
Start: 2021-09-17

## 2021-09-17 NOTE — TELEPHONE ENCOUNTER
Routing to PCP- see MyChart mesg    Adderall rx pending approval if appropriate    Also forwarding to TC to review attached form.    Morenita Patel RN

## 2021-09-17 NOTE — TELEPHONE ENCOUNTER
The attached records are not readable when printed.  We will have to wait for the records to be dropped off by patient.    CAROLYNE Lind  [facility]

## 2021-09-22 ENCOUNTER — VIRTUAL VISIT (OUTPATIENT)
Dept: FAMILY MEDICINE | Facility: CLINIC | Age: 36
End: 2021-09-22
Payer: COMMERCIAL

## 2021-09-22 DIAGNOSIS — F90.9 ATTENTION DEFICIT HYPERACTIVITY DISORDER (ADHD), UNSPECIFIED ADHD TYPE: Primary | ICD-10-CM

## 2021-09-22 DIAGNOSIS — T70.20XA EFFECTS OF HIGH ALTITUDE, INITIAL ENCOUNTER: ICD-10-CM

## 2021-09-22 PROCEDURE — 99214 OFFICE O/P EST MOD 30 MIN: CPT | Mod: GT | Performed by: NURSE PRACTITIONER

## 2021-09-22 NOTE — PROGRESS NOTES
"Boris is a 35 year old who is being evaluated via a billable video visit.      How would you like to obtain your AVS? MyChart  If the video visit is dropped, the invitation should be resent by: Text to cell phone: [phone]  Will anyone else be joining your video visit? No    Video Start Time: 9:20 AM    Assessment & Plan     (F90.9) Attention deficit hyperactivity disorder (ADHD), unspecified ADHD type  (primary encounter diagnosis)  Comment: Patient was followed by psychiatrist who has retired.  He needs his ADHD medications refilled and looking for new provider to prescribe.  He is on Adderall 20 mg 3 times daily this is over the recommended daily dose of 40 mg daily.  He has tried extended release in the past which caused insomnia.  He is open to decreasing the dose.  Discussed side effect of hypertension elevated heart rate with patient.  Plan: Adderall 20 mg immediate release 3 times daily prescribed advised patient to decrease dose by 10 mg to see how he does.  Follow-up in clinic    (T70.20XA) Effects of high altitude, initial encounter  Comment: He is going to [place] hunting he will be at greater than 8000 m high history of severe altitude sickness with pounding headache, slurred speech.  Plan: Discussed that ordinarily altitude sickness medication prophylactic and abortive is usually reserved for patients who have had severe history of altitude sickness and underlying comorbidities.  We will discuss more when I see him in clinic for his physical  Discussed ondansetron, ibuprofen and Tylenol and he has sumatriptan on as needed for his usual migraine therapy.    Prescription drug management  35minutes spent on the date of the encounter doing chart review, history and exam, documentation and further activities per the note       BMI:   Estimated body mass index is 32.54 kg/m  as calculated from the following:    Height as of 11/18/20: 1.727 m (5' 8\").    Weight as of 11/18/20: 97.1 kg (214 lb). "           No follow-ups on file.    CARLOS Ahuja CNP  [facility]    Justin Escalante is a 35 year old who presents for the following health issues     HPI       --Going to [place] in a few weeks for hunting. Has had altitude sickness in the past and wondering if there is any prescriptions he can get to take with him?  Says he has a history of altitude sickness he got a pounding headache, said he had slurred speech and difficulty walking.  He wants to know if there is any prophylactic medication he can take to help with this.    Medication Followup of Adderall    Taking Medication as prescribed: yes    Side Effects:  None    Medication Helping Symptoms:  Yes     reviewed Adderall just refilled yesterday by provider as pt was out of medication    Also reviewed faxed notes from psychiatrist office where pt was beng seen who has since retired.     Taking it for 17 years.     Tried extended release but it caused insomnia.               Review of Systems   Constitutional, HEENT, cardiovascular, pulmonary, gi and gu systems are negative, except as otherwise noted.      Objective           Vitals:  No vitals were obtained today due to virtual visit.    Physical Exam   GENERAL: Healthy, alert and no distress  EYES: Eyes grossly normal to inspection.  No discharge or erythema, or obvious scleral/conjunctival abnormalities.  RESP: No audible wheeze, cough, or visible cyanosis.  No visible retractions or increased work of breathing.    SKIN: Visible skin clear. No significant rash, abnormal pigmentation or lesions.  NEURO: Cranial nerves grossly intact.  Mentation and speech appropriate for age.  PSYCH: Mentation appears normal, affect normal/bright, judgement and insight intact, normal speech and appearance well-groomed.    Office Visit on 02/18/2021   Component Date Value Ref Range Status     COVID-19 Virus PCR to [facility] - So* 02/18/2021 Nasopharyngeal   Final     COVID-19 Virus  PCR to [facility] - Re* 02/18/2021 Not Detected   Final    Comment: Collection of multiple specimens from the same patient may be necessary to   detect the virus. The possibility of a false negative should be considered if   the patient's recent exposure or clinical presentation suggests 2019 nCOV   infection and diagnostic tests for other causes of illness are negative.   Repeat testing may be considered in this setting.  Patient sample was heat inactivated and amplified using the HDPCR SARS-CoV-2   assay ([employer] The HDPCRTM SARS-CoV-2 assay is a reverse   transcription real-time polymerase chain reaction (qRT-PCR) test intended for   the qualitative detection of nucleic acid  from SARS-CoV-2 in human nasopharyngeal swabs, oropharyngeal swabs, anterior   nasal swabs, mid-turbinate nasal swabs as well as nasal aspirate, nasal wash,   and bronchoalveolar lavage (BAL) specimens from individuals who are suspected   of COVID-19 by their healthcare provider.  A negative result does not rule out the presence of real-time PCR inhibitors   in the sp                           ecimen or COVID-19 RNA in concentrations below the limit of detection   of the assay. The possibility of a false negative should be considered if the   patients recent exposure or clinical presentation suggests COVID-19.   Additional testing or repeat testing requires consultation with the   laboratory.  Nasopharyngeal specimen is the preferred choice for swab-based SARS CoV2   testing. When collection of a nasopharyngeal swab is not possible the   following are acceptable alternatives:  an oropharyngeal (OP) specimen collected by a healthcare professional, or a   nasal mid-turbinate (NMT) swab collected by a healthcare professional or by   onsite self-collection (using a flocked tapered swab), or an anterior nares   specimen collected by a healthcare professional or by onsite self-collection   (using a round foam swab). ([facility]  Control)  Testing performed by [facility] ([facility]) [address] [place] [address]  The test performance characteristics were determined by [facility]. It has not been   cleared or approved by the FDA.  The laboratory is regulated under the Clinical Laboratory Improvement   Amendments of 1988 (CLIA-88) as qualified to perform high-complexity testing.   This test is used for clinical purposes. It should not be regarded as   investigational or for research.                   Video-Visit Details    Type of service:  Video Visit    Video End Time:9:49 AM    Originating Location (pt. Location): Home    Distant Location (provider location):  [facility]     Platform used for Video Visit: [employer]

## 2021-10-08 ENCOUNTER — OFFICE VISIT (OUTPATIENT)
Dept: FAMILY MEDICINE | Facility: CLINIC | Age: 36
End: 2021-10-08
Payer: COMMERCIAL

## 2021-10-08 VITALS
WEIGHT: 215 LBS | TEMPERATURE: 98.1 F | BODY MASS INDEX: 30.78 KG/M2 | SYSTOLIC BLOOD PRESSURE: 122 MMHG | HEART RATE: 80 BPM | DIASTOLIC BLOOD PRESSURE: 76 MMHG | HEIGHT: 70 IN

## 2021-10-08 DIAGNOSIS — F90.9 ATTENTION DEFICIT HYPERACTIVITY DISORDER (ADHD), UNSPECIFIED ADHD TYPE: ICD-10-CM

## 2021-10-08 DIAGNOSIS — Z23 NEED FOR PROPHYLACTIC VACCINATION AND INOCULATION AGAINST INFLUENZA: ICD-10-CM

## 2021-10-08 DIAGNOSIS — Z00.00 ROUTINE GENERAL MEDICAL EXAMINATION AT A HEALTH CARE FACILITY: Primary | ICD-10-CM

## 2021-10-08 DIAGNOSIS — D72.829 LEUKOCYTOSIS, UNSPECIFIED TYPE: ICD-10-CM

## 2021-10-08 DIAGNOSIS — T70.20XA EFFECTS OF HIGH ALTITUDE, INITIAL ENCOUNTER: ICD-10-CM

## 2021-10-08 DIAGNOSIS — Z71.6 ENCOUNTER FOR TOBACCO USE CESSATION COUNSELING: ICD-10-CM

## 2021-10-08 DIAGNOSIS — Z02.9 ADMINISTRATIVE ENCOUNTER: ICD-10-CM

## 2021-10-08 LAB
BASOPHILS # BLD AUTO: 0.1 10E3/UL (ref 0–0.2)
BASOPHILS NFR BLD AUTO: 0 %
EOSINOPHIL # BLD AUTO: 0.9 10E3/UL (ref 0–0.7)
EOSINOPHIL NFR BLD AUTO: 8 %
ERYTHROCYTE [DISTWIDTH] IN BLOOD BY AUTOMATED COUNT: 12.2 % (ref 10–15)
HCT VFR BLD AUTO: 45 % (ref 40–53)
HGB BLD-MCNC: 15.1 G/DL (ref 13.3–17.7)
LYMPHOCYTES # BLD AUTO: 2.2 10E3/UL (ref 0.8–5.3)
LYMPHOCYTES NFR BLD AUTO: 18 %
MCH RBC QN AUTO: 33.9 PG (ref 26.5–33)
MCHC RBC AUTO-ENTMCNC: 33.6 G/DL (ref 31.5–36.5)
MCV RBC AUTO: 101 FL (ref 78–100)
MONOCYTES # BLD AUTO: 0.7 10E3/UL (ref 0–1.3)
MONOCYTES NFR BLD AUTO: 6 %
NEUTROPHILS # BLD AUTO: 8.1 10E3/UL (ref 1.6–8.3)
NEUTROPHILS NFR BLD AUTO: 68 %
PLATELET # BLD AUTO: 232 10E3/UL (ref 150–450)
RBC # BLD AUTO: 4.46 10E6/UL (ref 4.4–5.9)
WBC # BLD AUTO: 11.8 10E3/UL (ref 4–11)

## 2021-10-08 PROCEDURE — 99213 OFFICE O/P EST LOW 20 MIN: CPT | Mod: 25 | Performed by: NURSE PRACTITIONER

## 2021-10-08 PROCEDURE — 80061 LIPID PANEL: CPT | Performed by: NURSE PRACTITIONER

## 2021-10-08 PROCEDURE — 36415 COLL VENOUS BLD VENIPUNCTURE: CPT | Performed by: NURSE PRACTITIONER

## 2021-10-08 PROCEDURE — 90686 IIV4 VACC NO PRSV 0.5 ML IM: CPT | Performed by: NURSE PRACTITIONER

## 2021-10-08 PROCEDURE — 80048 BASIC METABOLIC PNL TOTAL CA: CPT | Performed by: NURSE PRACTITIONER

## 2021-10-08 PROCEDURE — 85025 COMPLETE CBC W/AUTO DIFF WBC: CPT | Performed by: NURSE PRACTITIONER

## 2021-10-08 PROCEDURE — 99395 PREV VISIT EST AGE 18-39: CPT | Mod: 25 | Performed by: NURSE PRACTITIONER

## 2021-10-08 PROCEDURE — 90471 IMMUNIZATION ADMIN: CPT | Performed by: NURSE PRACTITIONER

## 2021-10-08 RX ORDER — DEXTROAMPHETAMINE SACCHARATE, AMPHETAMINE ASPARTATE, DEXTROAMPHETAMINE SULFATE AND AMPHETAMINE SULFATE 5; 5; 5; 5 MG/1; MG/1; MG/1; MG/1
20 TABLET ORAL DAILY
Qty: 30 TABLET | Refills: 0 | Status: SHIPPED | OUTPATIENT
Start: 2021-10-08 | End: 2021-10-29

## 2021-10-08 RX ORDER — ACETAZOLAMIDE 125 MG/1
125 TABLET ORAL 2 TIMES DAILY PRN
Qty: 6 TABLET | Refills: 0 | Status: SHIPPED | OUTPATIENT
Start: 2021-10-08 | End: 2022-05-10

## 2021-10-08 RX ORDER — DEXTROAMPHETAMINE SACCHARATE, AMPHETAMINE ASPARTATE, DEXTROAMPHETAMINE SULFATE AND AMPHETAMINE SULFATE 5; 5; 5; 5 MG/1; MG/1; MG/1; MG/1
20 TABLET ORAL DAILY
Qty: 30 TABLET | Refills: 0 | Status: SHIPPED | OUTPATIENT
Start: 2021-11-08 | End: 2021-10-29

## 2021-10-08 RX ORDER — DEXTROAMPHETAMINE SACCHARATE, AMPHETAMINE ASPARTATE, DEXTROAMPHETAMINE SULFATE AND AMPHETAMINE SULFATE 5; 5; 5; 5 MG/1; MG/1; MG/1; MG/1
20 TABLET ORAL DAILY
Qty: 30 TABLET | Refills: 0 | Status: SHIPPED | OUTPATIENT
Start: 2021-12-09 | End: 2021-10-29

## 2021-10-08 ASSESSMENT — ENCOUNTER SYMPTOMS
FEVER: 0
CONSTIPATION: 0
DYSURIA: 0
ABDOMINAL PAIN: 0
HEMATOCHEZIA: 0
PARESTHESIAS: 0
JOINT SWELLING: 0
NERVOUS/ANXIOUS: 0
PALPITATIONS: 0
COUGH: 0
EYE PAIN: 0
SORE THROAT: 0
FREQUENCY: 0
MYALGIAS: 0
ARTHRALGIAS: 0
HEADACHES: 0
HEMATURIA: 0
HEARTBURN: 0
DIZZINESS: 0
DIARRHEA: 0
CHILLS: 0
NAUSEA: 0
SHORTNESS OF BREATH: 0
WEAKNESS: 0

## 2021-10-08 ASSESSMENT — MIFFLIN-ST. JEOR: SCORE: 1893.54

## 2021-10-08 NOTE — PROGRESS NOTES
SUBJECTIVE:   CC: Boris Berumen is an 36 year old male who presents for preventative health visit.       Patient has been advised of split billing requirements and indicates understanding: Yes  Healthy Habits:     Getting at least 3 servings of Calcium per day:  Yes    Bi-annual eye exam:  NO    Dental care twice a year:  NO    Sleep apnea or symptoms of sleep apnea:  Sleep apnea    Diet:  Regular (no restrictions), Breakfast skipped and Other    Frequency of exercise:  4-5 days/week    Duration of exercise:  45-60 minutes    Taking medications regularly:  Yes    PHQ-2 Total Score: 0    Additional concerns today:  No    Going hiking and [place] [address] feet elevation. Wants prophylactic medication for altitude sickness.     Smoking - using nicotine lozenges. About 8 cigarettes daily.     ADHD- Rx 20 mg TID provider wants him to decrease to 40 mg daily     GUSTAVO- needs new mouthpiece    Wants paperwork filled out for adoption     Today's PHQ-2 Score:   PHQ-2 ( 1999 [employer]) 10/8/2021   Q1: Little interest or pleasure in doing things 0   Q2: Feeling down, depressed or hopeless 0   PHQ-2 Score 0   Q1: Little interest or pleasure in doing things Not at all   Q2: Feeling down, depressed or hopeless Not at all   PHQ-2 Score 0       Abuse: Current or Past(Physical, Sexual or Emotional)- No  Do you feel safe in your environment? Yes    Have you ever done Advance Care Planning? (For example, a Health Directive, POLST, or a discussion with a medical provider or your loved ones about your wishes):     Social History     Tobacco Use     Smoking status: Current Every Day Smoker     Packs/day: 0.50     Years: 10.00     Pack years: 5.00     Types: Cigarettes     Start date: 1/1/2001     Smokeless tobacco: Never Used   Substance Use Topics     Alcohol use: Yes     Comment: couple drinks per week socially     If you drink alcohol do you typically have >3 drinks per day or >7 drinks per week? No    Alcohol Use 10/8/2021   Prescreen:  >3 drinks/day or >7 drinks/week? No   Prescreen: >3 drinks/day or >7 drinks/week? -   No flowsheet data found.    Last PSA: No results found for: PSA    Reviewed orders with patient. Reviewed health maintenance and updated orders accordingly - Yes  Lab work is in process    Reviewed and updated as needed this visit by clinical staff  Tobacco  Allergies  Meds   Med Hx  Surg Hx  Fam Hx  Soc Hx        Reviewed and updated as needed this visit by Provider                    Review of Systems   Constitutional: Negative for chills and fever.   HENT: Positive for congestion. Negative for ear pain, hearing loss and sore throat.    Eyes: Negative for pain and visual disturbance.   Respiratory: Negative for cough and shortness of breath.    Cardiovascular: Negative for chest pain, palpitations and peripheral edema.   Gastrointestinal: Negative for abdominal pain, constipation, diarrhea, heartburn, hematochezia and nausea.   Genitourinary: Positive for impotence. Negative for discharge, dysuria, frequency, genital sores, hematuria and urgency.   Musculoskeletal: Negative for arthralgias, joint swelling and myalgias.   Skin: Negative for rash.   Neurological: Negative for dizziness, weakness, headaches and paresthesias.   Psychiatric/Behavioral: Negative for mood changes. The patient is not nervous/anxious.      OBJECTIVE:   There were no vitals taken for this visit.    Physical Exam  GENERAL: healthy, alert and no distress  EYES: Eyes grossly normal to inspection, PERRL and conjunctivae and sclerae normal  HENT: ear canals and TM's normal, nose and mouth without ulcers or lesions  NECK: no adenopathy, no asymmetry, masses, or scars and thyroid normal to palpation  RESP: lungs clear to auscultation - no rales, rhonchi or wheezes  CV: regular rate and rhythm, normal S1 S2, no S3 or S4, no murmur, click or rub, no peripheral edema and peripheral pulses strong  ABDOMEN: soft, nontender, no hepatosplenomegaly, no masses and  bowel sounds normal  MS: no gross musculoskeletal defects noted, no edema  SKIN: no suspicious lesions or rashes  NEURO: Normal strength and tone, mentation intact and speech normal  PSYCH: mentation appears normal, affect normal/bright    Diagnostic Test Results:  Labs reviewed in Epic  Results for orders placed or performed in visit on 10/08/21 (from the past 24 hour(s))   CBC with platelets and differential    Narrative    The following orders were created for panel order CBC with platelets and differential.  Procedure                               Abnormality         Status                     ---------                               -----------         ------                     CBC with platelets and d...[[id number]]  Abnormal            Final result                 Please view results for these tests on the individual orders.   CBC with platelets and differential   Result Value Ref Range    WBC Count 11.8 (H) 4.0 - 11.0 10e3/uL    RBC Count 4.46 4.40 - 5.90 10e6/uL    Hemoglobin 15.1 13.3 - 17.7 g/dL    Hematocrit 45.0 40.0 - 53.0 %     (H) 78 - 100 fL    MCH 33.9 (H) 26.5 - 33.0 pg    MCHC 33.6 31.5 - 36.5 g/dL    RDW 12.2 10.0 - 15.0 %    Platelet Count 232 150 - 450 10e3/uL    % Neutrophils 68 %    % Lymphocytes 18 %    % Monocytes 6 %    % Eosinophils 8 %    % Basophils 0 %    Absolute Neutrophils 8.1 1.6 - 8.3 10e3/uL    Absolute Lymphocytes 2.2 0.8 - 5.3 10e3/uL    Absolute Monocytes 0.7 0.0 - 1.3 10e3/uL    Absolute Eosinophils 0.9 (H) 0.0 - 0.7 10e3/uL    Absolute Basophils 0.1 0.0 - 0.2 10e3/uL       ASSESSMENT/PLAN:       ICD-10-CM    1. Routine general medical examination at a health care facility  Z00.00 Lipid panel reflex to direct LDL Fasting     Basic metabolic panel  (Ca, Cl, CO2, Creat, Gluc, K, Na, BUN)     CBC with platelets and differential     Lipid panel reflex to direct LDL Fasting     Basic metabolic panel  (Ca, Cl, CO2, Creat, Gluc, K, Na, BUN)     CBC with platelets and  "differential   2. Need for prophylactic vaccination and inoculation against influenza  Z23 INFLUENZA VACCINE IM > 6 MONTHS VALENT IIV4 (AFLURIA/FLUZONE)   3. Attention deficit hyperactivity disorder (ADHD), unspecified ADHD type  F90.9 amphetamine-dextroamphetamine (ADDERALL) 20 MG tablet     amphetamine-dextroamphetamine (ADDERALL) 20 MG tablet     amphetamine-dextroamphetamine (ADDERALL) 20 MG tablet   4. Effects of high altitude, initial encounter  T70.20XA    5. Administrative encounter  Z02.9    6. Encounter for tobacco use cessation counseling  Z71.6      ADHD - discussed decreasing to 20 mg BID- will continue to try but 3/7 days he says he needs 20 mg TID to help with focus  altitude sickness- will check with MTM - checked with travel clinic and safe to rx but usually reserved for hikes like [employer].    Paperwork completed for adoption  Flu vac today   Smoking cessation strongly encouraged     Patient has been advised of split billing requirements and indicates understanding: Yes  COUNSELING:   Reviewed preventive health counseling, as reflected in patient instructions       Regular exercise       Healthy diet/nutrition    Estimated body mass index is 32.54 kg/m  as calculated from the following:    Height as of 11/18/20: 1.727 m (5' 8\").    Weight as of 11/18/20: 97.1 kg (214 lb).     Weight management plan: Discussed healthy diet and exercise guidelines    He reports that he has been smoking cigarettes. He started smoking about 20 years ago. He has a 5.00 pack-year smoking history. He has never used smokeless tobacco.  Tobacco Cessation Action Plan:   Information offered: Patient not interested at this time      Counseling Resources:  ATP IV Guidelines  Pooled Cohorts Equation Calculator  FRAX Risk Assessment  ICSI Preventive Guidelines  Dietary Guidelines for Americans, 2010  USDA's MyPlate  ASA Prophylaxis  Lung CA Screening    CARLOS Ahuja CNP  [facility]  "

## 2021-10-08 NOTE — Clinical Note
Jerry bolton- have you ever rx acetazolamide for altitude sickness ? This pt wants me to prescribe it he [place] [address] feet

## 2021-10-09 LAB
ANION GAP SERPL CALCULATED.3IONS-SCNC: 4 MMOL/L (ref 3–14)
BUN SERPL-MCNC: 11 MG/DL (ref 7–30)
CALCIUM SERPL-MCNC: 9.6 MG/DL (ref 8.5–10.1)
CHLORIDE BLD-SCNC: 109 MMOL/L (ref 94–109)
CHOLEST SERPL-MCNC: 201 MG/DL
CO2 SERPL-SCNC: 26 MMOL/L (ref 20–32)
CREAT SERPL-MCNC: 0.95 MG/DL (ref 0.66–1.25)
FASTING STATUS PATIENT QL REPORTED: ABNORMAL
GFR SERPL CREATININE-BSD FRML MDRD: >90 ML/MIN/1.73M2
GLUCOSE BLD-MCNC: 99 MG/DL (ref 70–99)
HDLC SERPL-MCNC: 44 MG/DL
LDLC SERPL CALC-MCNC: 132 MG/DL
NONHDLC SERPL-MCNC: 157 MG/DL
POTASSIUM BLD-SCNC: 5.3 MMOL/L (ref 3.4–5.3)
SODIUM SERPL-SCNC: 139 MMOL/L (ref 133–144)
TRIGL SERPL-MCNC: 126 MG/DL

## 2022-01-24 DIAGNOSIS — F90.9 ATTENTION DEFICIT HYPERACTIVITY DISORDER (ADHD), UNSPECIFIED ADHD TYPE: ICD-10-CM

## 2022-03-17 ENCOUNTER — MYC REFILL (OUTPATIENT)
Dept: FAMILY MEDICINE | Facility: CLINIC | Age: 37
End: 2022-03-17
Payer: COMMERCIAL

## 2022-03-17 DIAGNOSIS — F90.9 ATTENTION DEFICIT HYPERACTIVITY DISORDER (ADHD), UNSPECIFIED ADHD TYPE: ICD-10-CM

## 2022-03-17 NOTE — TELEPHONE ENCOUNTER
Routing refill request to provider for review/approval because:  Drug not on the [facility] refill protocol     Marquez Herrera RN, BSN, PHN  [facility]

## 2022-03-18 RX ORDER — DEXTROAMPHETAMINE SACCHARATE, AMPHETAMINE ASPARTATE, DEXTROAMPHETAMINE SULFATE AND AMPHETAMINE SULFATE 5; 5; 5; 5 MG/1; MG/1; MG/1; MG/1
20 TABLET ORAL 3 TIMES DAILY
Qty: 90 TABLET | Refills: 0 | Status: SHIPPED | OUTPATIENT
Start: 2022-03-18 | End: 2022-04-13

## 2022-04-13 ENCOUNTER — MYC REFILL (OUTPATIENT)
Dept: FAMILY MEDICINE | Facility: CLINIC | Age: 37
End: 2022-04-13
Payer: COMMERCIAL

## 2022-04-13 DIAGNOSIS — F90.9 ATTENTION DEFICIT HYPERACTIVITY DISORDER (ADHD), UNSPECIFIED ADHD TYPE: ICD-10-CM

## 2022-04-13 NOTE — TELEPHONE ENCOUNTER
Routing refill request to provider for review/approval because:  Drug not on the [facility] refill protocol     Morenita Patel RN

## 2022-04-14 RX ORDER — DEXTROAMPHETAMINE SACCHARATE, AMPHETAMINE ASPARTATE, DEXTROAMPHETAMINE SULFATE AND AMPHETAMINE SULFATE 5; 5; 5; 5 MG/1; MG/1; MG/1; MG/1
20 TABLET ORAL 3 TIMES DAILY
Qty: 90 TABLET | Refills: 0 | Status: SHIPPED | OUTPATIENT
Start: 2022-04-14 | End: 2022-08-11

## 2022-05-10 ENCOUNTER — MYC MEDICAL ADVICE (OUTPATIENT)
Dept: FAMILY MEDICINE | Facility: CLINIC | Age: 37
End: 2022-05-10

## 2022-05-10 ENCOUNTER — OFFICE VISIT (OUTPATIENT)
Dept: FAMILY MEDICINE | Facility: CLINIC | Age: 37
End: 2022-05-10
Payer: COMMERCIAL

## 2022-05-10 VITALS
BODY MASS INDEX: 30.78 KG/M2 | DIASTOLIC BLOOD PRESSURE: 80 MMHG | HEIGHT: 70 IN | WEIGHT: 215 LBS | TEMPERATURE: 98.3 F | SYSTOLIC BLOOD PRESSURE: 124 MMHG | HEART RATE: 109 BPM | RESPIRATION RATE: 20 BRPM | OXYGEN SATURATION: 96 %

## 2022-05-10 DIAGNOSIS — D72.829 LEUKOCYTOSIS, UNSPECIFIED TYPE: ICD-10-CM

## 2022-05-10 DIAGNOSIS — F90.2 ATTENTION DEFICIT HYPERACTIVITY DISORDER (ADHD), COMBINED TYPE: Primary | ICD-10-CM

## 2022-05-10 DIAGNOSIS — Z11.4 SCREENING FOR HIV (HUMAN IMMUNODEFICIENCY VIRUS): ICD-10-CM

## 2022-05-10 DIAGNOSIS — Z11.59 NEED FOR HEPATITIS C SCREENING TEST: ICD-10-CM

## 2022-05-10 LAB
BASOPHILS # BLD AUTO: 0 10E3/UL (ref 0–0.2)
BASOPHILS NFR BLD AUTO: 0 %
EOSINOPHIL # BLD AUTO: 0.4 10E3/UL (ref 0–0.7)
EOSINOPHIL NFR BLD AUTO: 3 %
ERYTHROCYTE [DISTWIDTH] IN BLOOD BY AUTOMATED COUNT: 11.9 % (ref 10–15)
HCT VFR BLD AUTO: 44.8 % (ref 40–53)
HGB BLD-MCNC: 15.3 G/DL (ref 13.3–17.7)
LYMPHOCYTES # BLD AUTO: 3 10E3/UL (ref 0.8–5.3)
LYMPHOCYTES NFR BLD AUTO: 24 %
MCH RBC QN AUTO: 34 PG (ref 26.5–33)
MCHC RBC AUTO-ENTMCNC: 34.2 G/DL (ref 31.5–36.5)
MCV RBC AUTO: 100 FL (ref 78–100)
MONOCYTES # BLD AUTO: 0.7 10E3/UL (ref 0–1.3)
MONOCYTES NFR BLD AUTO: 6 %
NEUTROPHILS # BLD AUTO: 8.2 10E3/UL (ref 1.6–8.3)
NEUTROPHILS NFR BLD AUTO: 67 %
PLATELET # BLD AUTO: 279 10E3/UL (ref 150–450)
RBC # BLD AUTO: 4.5 10E6/UL (ref 4.4–5.9)
WBC # BLD AUTO: 12.3 10E3/UL (ref 4–11)

## 2022-05-10 PROCEDURE — 85025 COMPLETE CBC W/AUTO DIFF WBC: CPT | Performed by: NURSE PRACTITIONER

## 2022-05-10 PROCEDURE — 99214 OFFICE O/P EST MOD 30 MIN: CPT | Mod: 25 | Performed by: NURSE PRACTITIONER

## 2022-05-10 PROCEDURE — 87389 HIV-1 AG W/HIV-1&-2 AB AG IA: CPT | Performed by: NURSE PRACTITIONER

## 2022-05-10 PROCEDURE — 36415 COLL VENOUS BLD VENIPUNCTURE: CPT | Performed by: NURSE PRACTITIONER

## 2022-05-10 PROCEDURE — 90670 PCV13 VACCINE IM: CPT | Performed by: NURSE PRACTITIONER

## 2022-05-10 PROCEDURE — 93000 ELECTROCARDIOGRAM COMPLETE: CPT | Performed by: NURSE PRACTITIONER

## 2022-05-10 PROCEDURE — 90471 IMMUNIZATION ADMIN: CPT | Performed by: NURSE PRACTITIONER

## 2022-05-10 PROCEDURE — 86803 HEPATITIS C AB TEST: CPT | Performed by: NURSE PRACTITIONER

## 2022-05-10 RX ORDER — DEXTROAMPHETAMINE SACCHARATE, AMPHETAMINE ASPARTATE, DEXTROAMPHETAMINE SULFATE AND AMPHETAMINE SULFATE 5; 5; 5; 5 MG/1; MG/1; MG/1; MG/1
20 TABLET ORAL 3 TIMES DAILY
Qty: 90 TABLET | Refills: 0 | Status: SHIPPED | OUTPATIENT
Start: 2022-06-10 | End: 2022-07-10

## 2022-05-10 RX ORDER — DEXTROAMPHETAMINE SACCHARATE, AMPHETAMINE ASPARTATE, DEXTROAMPHETAMINE SULFATE AND AMPHETAMINE SULFATE 5; 5; 5; 5 MG/1; MG/1; MG/1; MG/1
20 TABLET ORAL 3 TIMES DAILY
Qty: 90 TABLET | Refills: 0 | Status: SHIPPED | OUTPATIENT
Start: 2022-05-10 | End: 2022-06-09

## 2022-05-10 RX ORDER — DEXTROAMPHETAMINE SACCHARATE, AMPHETAMINE ASPARTATE, DEXTROAMPHETAMINE SULFATE AND AMPHETAMINE SULFATE 5; 5; 5; 5 MG/1; MG/1; MG/1; MG/1
20 TABLET ORAL 3 TIMES DAILY
Qty: 90 TABLET | Refills: 0 | Status: SHIPPED | OUTPATIENT
Start: 2022-07-11 | End: 2022-08-10

## 2022-05-10 ASSESSMENT — PAIN SCALES - GENERAL: PAINLEVEL: NO PAIN (0)

## 2022-05-10 NOTE — PROGRESS NOTES
"  Assessment & Plan     Attention deficit hyperactivity disorder (ADHD), combined type  Has been on Adderall 20 mg immediate release 3 times daily for years.  He was followed by psychiatry who is prescribed this dose.  Has tried lower doses and has been ineffective and unable to tolerate extended release causes insomnia.  [place]  reviewed.  EKG done today and normal sinus rhythm.  3-month postdated scripts prescribed.  He will follow-up with Dr. Harding in August to establish care  - REVIEW OF HEALTH MAINTENANCE PROTOCOL ORDERS  - EKG 12-lead complete w/read - Clinics    Screening for HIV (human immunodeficiency virus)  Labs   - HIV Antigen Antibody Combo; Future    Need for hepatitis C screening test  Labs   - Hepatitis C Screen Reflex to HCV RNA Quant and Genotype; Future      (D73.088) Leukocytosis, unspecified type  Comment: Found incidentally at his physical in the absence of other symptoms never returned for repeat CBC lab released today most likely etiology is from smoking given he is without any other symptoms and any other lab abnormalities.  Plan: Based on results  Again noted WBCs 12.3 with normal neutrophils and lymphocytes.  No further action.    Prescription drug management  25 minutes spent on the date of the encounter doing chart review, history and exam, documentation and further activities per the note       BMI:   Estimated body mass index is 31.29 kg/m  as calculated from the following:    Height as of this encounter: 1.765 m (5' 9.5\").    Weight as of this encounter: 97.5 kg (215 lb).   Weight management plan: Discussed healthy diet and exercise guidelines    See Patient Instructions    No follow-ups on file.    CARLOS Ahuja CNP  [facility]    Justin Escalante is a 36 year old who presents for the following health issues     A.D.H.D    History of Present Illness       Reason for visit:  Check up  Symptoms include:  None    He eats 2-3 servings of fruits and " "vegetables daily.He consumes 1 sweetened beverage(s) daily.He exercises with enough effort to increase his heart rate 30 to 60 minutes per day.  He exercises with enough effort to increase his heart rate 5 days per week.   He is taking medications regularly.       Medication Followup of Adderall    Taking Medication as prescribed: yes    Side Effects:  None    Medication Helping Symptoms:  Yes    Was followed by psychiatry in the past and was prescribed this dose. Understand risks. Cannot get in with psych.     Reviewed [facility] last refill 4/17/2022    Says if he runs out of meds he is completely absent minded, no focus.     Got tested and started stimulants month after he dropped out of college          Review of Systems   Constitutional, HEENT, cardiovascular, pulmonary, gi and gu systems are negative, except as otherwise noted.      Objective    /80 (BP Location: Right arm, Patient Position: Sitting, Cuff Size: Adult Large)   Pulse 109   Temp 98.3  F (36.8  C) (Tympanic)   Resp 20   Ht 1.765 m (5' 9.5\")   Wt 97.5 kg (215 lb)   SpO2 96%   BMI 31.29 kg/m    Body mass index is 31.29 kg/m .  Physical Exam   GENERAL: healthy, alert and no distress  NECK: no adenopathy, no asymmetry, masses, or scars and thyroid normal to palpation  RESP: lungs clear to auscultation - no rales, rhonchi or wheezes  CV: regular rate and rhythm, normal S1 S2, no S3 or S4, no murmur, click or rub, no peripheral edema and peripheral pulses strong  ABDOMEN: soft, nontender, no hepatosplenomegaly, no masses and bowel sounds normal  MS: no gross musculoskeletal defects noted, no edema    Office Visit on 10/08/2021   Component Date Value Ref Range Status     Cholesterol 10/08/2021 201 (A) <200 mg/dL Final     Triglycerides 10/08/2021 126  <150 mg/dL Final     Direct Measure HDL 10/08/2021 44  >=40 mg/dL Final     LDL Cholesterol Calculated 10/08/2021 132 (A) <=100 mg/dL Final     Non HDL Cholesterol 10/08/2021 157 (A) <130 mg/dL " Final     Patient Fasting > 8hrs? 10/08/2021 Unknown   Final     Sodium 10/08/2021 139  133 - 144 mmol/L Final     Potassium 10/08/2021 5.3  3.4 - 5.3 mmol/L Final     Chloride 10/08/2021 109  94 - 109 mmol/L Final     Carbon Dioxide (CO2) 10/08/2021 26  20 - 32 mmol/L Final     Anion Gap 10/08/2021 4  3 - 14 mmol/L Final     Urea Nitrogen 10/08/2021 11  7 - 30 mg/dL Final     Creatinine 10/08/2021 0.95  0.66 - 1.25 mg/dL Final     Calcium 10/08/2021 9.6  8.5 - 10.1 mg/dL Final     Glucose 10/08/2021 99  70 - 99 mg/dL Final     GFR Estimate 10/08/2021 >90  >60 mL/min/1.73m2 Final    As of July 11, 2021, eGFR is calculated by the CKD-EPI creatinine equation, without race adjustment. eGFR can be influenced by muscle mass, exercise, and diet. The reported eGFR is an estimation only and is only applicable if the renal function is stable.     WBC Count 10/08/2021 11.8 (A) 4.0 - 11.0 10e3/uL Final     RBC Count 10/08/2021 4.46  4.40 - 5.90 10e6/uL Final     Hemoglobin 10/08/2021 15.1  13.3 - 17.7 g/dL Final     Hematocrit 10/08/2021 45.0  40.0 - 53.0 % Final     MCV 10/08/2021 101 (A) 78 - 100 fL Final     MCH 10/08/2021 33.9 (A) 26.5 - 33.0 pg Final     MCHC 10/08/2021 33.6  31.5 - 36.5 g/dL Final     RDW 10/08/2021 12.2  10.0 - 15.0 % Final     Platelet Count 10/08/2021 232  150 - 450 10e3/uL Final     % Neutrophils 10/08/2021 68  % Final     % Lymphocytes 10/08/2021 18  % Final     % Monocytes 10/08/2021 6  % Final     % Eosinophils 10/08/2021 8  % Final     % Basophils 10/08/2021 0  % Final     Absolute Neutrophils 10/08/2021 8.1  1.6 - 8.3 10e3/uL Final     Absolute Lymphocytes 10/08/2021 2.2  0.8 - 5.3 10e3/uL Final     Absolute Monocytes 10/08/2021 0.7  0.0 - 1.3 10e3/uL Final     Absolute Eosinophils 10/08/2021 0.9 (A) 0.0 - 0.7 10e3/uL Final     Absolute Basophils 10/08/2021 0.1  0.0 - 0.2 10e3/uL Final

## 2022-05-11 LAB
HCV AB SERPL QL IA: NONREACTIVE
HIV 1+2 AB+HIV1 P24 AG SERPL QL IA: NONREACTIVE

## 2022-08-11 ENCOUNTER — VIRTUAL VISIT (OUTPATIENT)
Dept: FAMILY MEDICINE | Facility: CLINIC | Age: 37
End: 2022-08-11
Payer: COMMERCIAL

## 2022-08-11 DIAGNOSIS — F90.2 ATTENTION DEFICIT HYPERACTIVITY DISORDER (ADHD), COMBINED TYPE: Primary | ICD-10-CM

## 2022-08-11 PROBLEM — M25.562 ACUTE PAIN OF LEFT KNEE: Status: RESOLVED | Noted: 2018-09-30 | Resolved: 2022-08-11

## 2022-08-11 PROCEDURE — 99213 OFFICE O/P EST LOW 20 MIN: CPT | Mod: GT | Performed by: FAMILY MEDICINE

## 2022-08-11 RX ORDER — DEXTROAMPHETAMINE SACCHARATE, AMPHETAMINE ASPARTATE, DEXTROAMPHETAMINE SULFATE AND AMPHETAMINE SULFATE 5; 5; 5; 5 MG/1; MG/1; MG/1; MG/1
20 TABLET ORAL 3 TIMES DAILY
Qty: 90 TABLET | Refills: 0 | Status: SHIPPED | OUTPATIENT
Start: 2022-10-12 | End: 2022-12-20

## 2022-08-11 RX ORDER — DEXTROAMPHETAMINE SACCHARATE, AMPHETAMINE ASPARTATE, DEXTROAMPHETAMINE SULFATE AND AMPHETAMINE SULFATE 5; 5; 5; 5 MG/1; MG/1; MG/1; MG/1
20 TABLET ORAL 3 TIMES DAILY
Qty: 90 TABLET | Refills: 0 | Status: SHIPPED | OUTPATIENT
Start: 2022-08-11 | End: 2022-12-14

## 2022-08-11 RX ORDER — DEXTROAMPHETAMINE SACCHARATE, AMPHETAMINE ASPARTATE, DEXTROAMPHETAMINE SULFATE AND AMPHETAMINE SULFATE 5; 5; 5; 5 MG/1; MG/1; MG/1; MG/1
20 TABLET ORAL 3 TIMES DAILY
Qty: 90 TABLET | Refills: 0 | Status: SHIPPED | OUTPATIENT
Start: 2022-09-11 | End: 2022-12-20

## 2022-08-11 NOTE — PROGRESS NOTES
Boris is a 36 year old who is being evaluated via a billable video visit.      How would you like to obtain your AVS? MyChart  If the video visit is dropped, the invitation should be resent by: Text to cell phone: [phone]  Will anyone else be joining your video visit? No    ============================================================================      Assessment & Plan     Attention deficit hyperactivity disorder (ADHD), combined type  - Stable, continue Adderall at current dose  - Refills sent for August, September, and October  - Ok to call for three more refills in November  - amphetamine-dextroamphetamine (ADDERALL) 20 MG tablet; Take 1 tablet (20 mg) by mouth 3 times daily  - amphetamine-dextroamphetamine (ADDERALL) 20 MG tablet; Take 1 tablet (20 mg) by mouth 3 times daily  - amphetamine-dextroamphetamine (ADDERALL) 20 MG tablet; Take 1 tablet (20 mg) by mouth 3 times daily      Return in about 6 months (around 2/11/2023) for ADHD.    Candi Harding, [facility]    ==============================================================================    Subjective   Boris is a 36 year old, presenting for the following health issues:  A.D.H.D      History of Present Illness       Reason for visit:  ADHD    He eats 2-3 servings of fruits and vegetables daily.He consumes 1 sweetened beverage(s) daily.He exercises with enough effort to increase his heart rate 30 to 60 minutes per day.  He exercises with enough effort to increase his heart rate 7 days per week.   He is taking medications regularly.     Patient is establishing care today.  Previously seen by Yanet Flores NP who has recently resigned.      Medication Followup of Adderall    Taking Medication as prescribed: yes    Side Effects:  None    Medication Helping Symptoms:  Yes    Patient takes Adderall 20 mg TID for his ADHD.  He has been on this dose since 2015.  He feels that it's working well.    PDMP: Appropriate.  Last  fill on 7/8/22.          Review of Systems   Constitutional, HEENT, cardiovascular, pulmonary, gi and gu systems are negative, except as otherwise noted.      Objective           Vitals:  No vitals were obtained today due to virtual visit.    Physical Exam   GENERAL: Healthy, alert and no distress  EYES: Eyes grossly normal to inspection.  No discharge or erythema, or obvious scleral/conjunctival abnormalities.  RESP: No audible wheeze, cough, or visible cyanosis.  No visible retractions or increased work of breathing.    SKIN: Visible skin clear. No significant rash, abnormal pigmentation or lesions.  NEURO: Cranial nerves grossly intact.  Mentation and speech appropriate for age.  PSYCH: Mentation appears normal, affect normal/bright, judgement and insight intact, normal speech and appearance well-groomed.          Video-Visit Details    Video Start Time: 11:37 AM    Type of service:  Video Visit    Video End Time: 11:48 AM     Originating Location (pt. Location): Home    Distant Location (provider location):  [facility]     Platform used for Video Visit: [employer]

## 2022-10-23 ENCOUNTER — HEALTH MAINTENANCE LETTER (OUTPATIENT)
Age: 37
End: 2022-10-23

## 2022-11-07 ENCOUNTER — MYC REFILL (OUTPATIENT)
Dept: FAMILY MEDICINE | Facility: CLINIC | Age: 37
End: 2022-11-07

## 2022-11-07 DIAGNOSIS — F90.2 ATTENTION DEFICIT HYPERACTIVITY DISORDER (ADHD), COMBINED TYPE: ICD-10-CM

## 2022-11-07 RX ORDER — DEXTROAMPHETAMINE SACCHARATE, AMPHETAMINE ASPARTATE, DEXTROAMPHETAMINE SULFATE AND AMPHETAMINE SULFATE 5; 5; 5; 5 MG/1; MG/1; MG/1; MG/1
20 TABLET ORAL 3 TIMES DAILY
Qty: 90 TABLET | Refills: 0 | Status: CANCELLED | OUTPATIENT
Start: 2022-11-07

## 2022-11-08 RX ORDER — DEXTROAMPHETAMINE SACCHARATE, AMPHETAMINE ASPARTATE, DEXTROAMPHETAMINE SULFATE AND AMPHETAMINE SULFATE 5; 5; 5; 5 MG/1; MG/1; MG/1; MG/1
20 TABLET ORAL 3 TIMES DAILY
Qty: 90 TABLET | Refills: 0 | Status: SHIPPED | OUTPATIENT
Start: 2022-11-08 | End: 2022-12-20

## 2022-11-08 RX ORDER — DEXTROAMPHETAMINE SACCHARATE, AMPHETAMINE ASPARTATE, DEXTROAMPHETAMINE SULFATE AND AMPHETAMINE SULFATE 5; 5; 5; 5 MG/1; MG/1; MG/1; MG/1
20 TABLET ORAL 3 TIMES DAILY
Qty: 90 TABLET | Refills: 0 | Status: SHIPPED | OUTPATIENT
Start: 2023-01-09 | End: 2022-12-20

## 2022-11-08 RX ORDER — DEXTROAMPHETAMINE SACCHARATE, AMPHETAMINE ASPARTATE, DEXTROAMPHETAMINE SULFATE AND AMPHETAMINE SULFATE 5; 5; 5; 5 MG/1; MG/1; MG/1; MG/1
20 TABLET ORAL 3 TIMES DAILY
Qty: 90 TABLET | Refills: 0 | Status: SHIPPED | OUTPATIENT
Start: 2022-12-09 | End: 2022-12-20

## 2022-12-10 ENCOUNTER — HEALTH MAINTENANCE LETTER (OUTPATIENT)
Age: 37
End: 2022-12-10

## 2022-12-14 DIAGNOSIS — F90.2 ATTENTION DEFICIT HYPERACTIVITY DISORDER (ADHD), COMBINED TYPE: ICD-10-CM

## 2022-12-14 RX ORDER — DEXTROAMPHETAMINE SACCHARATE, AMPHETAMINE ASPARTATE, DEXTROAMPHETAMINE SULFATE AND AMPHETAMINE SULFATE 5; 5; 5; 5 MG/1; MG/1; MG/1; MG/1
20 TABLET ORAL 3 TIMES DAILY
Qty: 90 TABLET | Refills: 0 | Status: SHIPPED | OUTPATIENT
Start: 2022-12-14 | End: 2023-05-12 | Stop reason: DRUGHIGH

## 2022-12-14 NOTE — TELEPHONE ENCOUNTER
Patient is scheduled 12/20/2022 and was given ten pills will need additional pills until appointment. Please advise when completed.

## 2022-12-20 ENCOUNTER — VIRTUAL VISIT (OUTPATIENT)
Dept: FAMILY MEDICINE | Facility: CLINIC | Age: 37
End: 2022-12-20
Payer: COMMERCIAL

## 2022-12-20 DIAGNOSIS — F90.2 ATTENTION DEFICIT HYPERACTIVITY DISORDER (ADHD), COMBINED TYPE: Primary | ICD-10-CM

## 2022-12-20 PROCEDURE — 99213 OFFICE O/P EST LOW 20 MIN: CPT | Mod: GT | Performed by: FAMILY MEDICINE

## 2022-12-20 RX ORDER — DEXTROAMPHETAMINE SACCHARATE, AMPHETAMINE ASPARTATE, DEXTROAMPHETAMINE SULFATE AND AMPHETAMINE SULFATE 5; 5; 5; 5 MG/1; MG/1; MG/1; MG/1
20 TABLET ORAL 3 TIMES DAILY
Qty: 90 TABLET | Refills: 0 | Status: SHIPPED | OUTPATIENT
Start: 2023-02-17 | End: 2023-03-19

## 2022-12-20 RX ORDER — DEXTROAMPHETAMINE SACCHARATE, AMPHETAMINE ASPARTATE, DEXTROAMPHETAMINE SULFATE AND AMPHETAMINE SULFATE 5; 5; 5; 5 MG/1; MG/1; MG/1; MG/1
20 TABLET ORAL 3 TIMES DAILY
Qty: 90 TABLET | Refills: 0 | Status: CANCELLED | OUTPATIENT
Start: 2022-12-20

## 2022-12-20 RX ORDER — DEXTROAMPHETAMINE SACCHARATE, AMPHETAMINE ASPARTATE, DEXTROAMPHETAMINE SULFATE AND AMPHETAMINE SULFATE 5; 5; 5; 5 MG/1; MG/1; MG/1; MG/1
20 TABLET ORAL 3 TIMES DAILY
Qty: 90 TABLET | Refills: 0 | Status: SHIPPED | OUTPATIENT
Start: 2023-03-17 | End: 2023-04-16

## 2022-12-20 RX ORDER — DEXTROAMPHETAMINE SACCHARATE, AMPHETAMINE ASPARTATE, DEXTROAMPHETAMINE SULFATE AND AMPHETAMINE SULFATE 5; 5; 5; 5 MG/1; MG/1; MG/1; MG/1
20 TABLET ORAL 3 TIMES DAILY
Qty: 90 TABLET | Refills: 0 | Status: SHIPPED | OUTPATIENT
Start: 2023-01-16 | End: 2023-02-23

## 2022-12-20 NOTE — PROGRESS NOTES
"Boris is a 37 year old who is being evaluated via a billable video visit.      How would you like to obtain your AVS? MyChart  If the video visit is dropped, the invitation should be resent by: Text to cell phone: [phone]  Will anyone else be joining your video visit? No        Assessment & Plan     Attention deficit hyperactivity disorder (ADHD), combined type  Doing well with his current medication, no side effect.  He has been on this combination for years.    In the future he may try to switch back to long-acting with a combination of short-acting.    He denies chest pain, shortness of breath, denies heart palpitation.  No history of depression, generalized anxiety disorder, no alcohol abuse or drugs abuse.    - amphetamine-dextroamphetamine (ADDERALL) 20 MG tablet; Take 1 tablet (20 mg) by mouth 3 times daily for 30 days  - amphetamine-dextroamphetamine (ADDERALL) 20 MG tablet; Take 1 tablet (20 mg) by mouth 3 times daily for 30 days  - amphetamine-dextroamphetamine (ADDERALL) 20 MG tablet; Take 1 tablet (20 mg) by mouth 3 times daily for 30 days      BMI:   Estimated body mass index is 31.29 kg/m  as calculated from the following:    Height as of 5/10/22: 1.765 m (5' 9.5\").    Weight as of 5/10/22: 97.5 kg (215 lb).   Weight management plan: Discussed healthy diet and exercise guidelines    Work on weight loss  Regular exercise    Return in about 3 months (around 3/20/2023) for Follow up.    Lanie Calixto MD  [facility]    Subjective   Boris is a 37 year old, presenting for the following health issues:  Recheck Medication      History of Present Illness       Reason for visit:  ADHD medication    He eats 2-3 servings of fruits and vegetables daily.He consumes 3 sweetened beverage(s) daily.He exercises with enough effort to increase his heart rate 60 or more minutes per day.  He exercises with enough effort to increase his heart rate 7 days per week.   He is taking medications " regularly.       Review of Systems   Constitutional, HEENT, cardiovascular, pulmonary, GI, , musculoskeletal, neuro, skin, endocrine and psych systems are negative, except as otherwise noted.      Objective           Vitals:  No vitals were obtained today due to virtual visit.    Physical Exam   GENERAL: Healthy, alert and no distress  EYES: Eyes grossly normal to inspection.  No discharge or erythema, or obvious scleral/conjunctival abnormalities.  RESP: No audible wheeze, cough, or visible cyanosis.  No visible retractions or increased work of breathing.    SKIN: Visible skin clear. No significant rash, abnormal pigmentation or lesions.  NEURO: Cranial nerves grossly intact.  Mentation and speech appropriate for age.  PSYCH: Mentation appears normal, affect normal/bright, judgement and insight intact, normal speech and appearance well-groomed.      Lanie Calixto MD        Video-Visit Details    Video Start Time: 12: 25 PM    Type of service:  Video Visit    Video End Time:12:35 PM    Originating Location (pt. Location): Home        Distant Location (provider location):  On-site    Platform used for Video Visit: Maricarmen

## 2023-02-23 ENCOUNTER — MYC MEDICAL ADVICE (OUTPATIENT)
Dept: FAMILY MEDICINE | Facility: CLINIC | Age: 38
End: 2023-02-23

## 2023-02-23 ENCOUNTER — MYC REFILL (OUTPATIENT)
Dept: FAMILY MEDICINE | Facility: CLINIC | Age: 38
End: 2023-02-23

## 2023-02-23 ENCOUNTER — E-VISIT (OUTPATIENT)
Dept: URGENT CARE | Facility: URGENT CARE | Age: 38
End: 2023-02-23
Payer: COMMERCIAL

## 2023-02-23 DIAGNOSIS — F90.2 ATTENTION DEFICIT HYPERACTIVITY DISORDER (ADHD), COMBINED TYPE: ICD-10-CM

## 2023-02-23 DIAGNOSIS — J01.90 ACUTE SINUSITIS WITH SYMPTOMS > 10 DAYS: Primary | ICD-10-CM

## 2023-02-23 PROCEDURE — 99421 OL DIG E/M SVC 5-10 MIN: CPT | Performed by: NURSE PRACTITIONER

## 2023-02-23 NOTE — TELEPHONE ENCOUNTER
RN replied to patient via [employer]. See message for details.     Marquez Herrera RN, BSN, PHN  [facility]: [place]

## 2023-02-23 NOTE — PATIENT INSTRUCTIONS
* Sinusitis (No Antibiotics)    The sinuses are air-filled spaces within the bones of the face. They connect to the inside of the nose. Sinusitis is an inflammation of the tissue that lines the sinuses. Sinusitis can occur during a cold. It can also happen due to allergies to pollens and other particles in the air. It can cause symptoms such as sinus congestion, headache, sore throat, facial swelling, and a feeling of fullness. It may also cause a low-grade fever. Your sinusitis does not include an infection with bacteria. Because of this, antibiotics are not used to treat this problem.  Home care    Drink plenty of water, hot tea, and other liquids. This may help thin nasal mucus. It also may help your sinuses drain fluids.    Heat may help soothe painful areas of your face. Use a towel soaked in hot water. Or,  the shower and direct the warm spray onto your face. Using a vaporizer along with a menthol rub at night may also help soothe symptoms.     An expectorant with guaifenesin may help thin nasal mucus and help your sinuses drain fluids.    You can use an over-the-counter decongestant, unless a similar medicine was prescribed to you. Nasal sprays work the fastest. Use one that contains phenylephrine or oxymetazoline. First blow your nose gently. Then use the spray. Do not use these medicines more often than directed on the label. If you do, your symptoms may get worse. You may also take pills that contain pseudoephedrine. Don t use products that combine multiple medicines. This is because side effects may be increased. Read all medicine labels. You can also ask the pharmacist for help. (People with high blood pressure should not use decongestants. They can raise blood pressure.)    Over-the-counter antihistamines may help if allergies contributed to your sinusitis.      Use acetaminophen or ibuprofen to control pain, unless another pain medicine was prescribed to you. If you have chronic liver or  kidney disease or ever had a stomach ulcer, talk with your healthcare provider before using these medicines. (Aspirin should never be taken by anyone under age 18 who is ill with a fever. It may cause severe liver damage.)    Use nasal rinses or irrigation as instructed by your healthcare provider.    Don't smoke. This can make symptoms worse.  Follow-up care  Follow up with your healthcare provider or our staff if you are NOT better in 1 week.  When to seek medical advice  Call your healthcare provider if any of these occur:    Green or yellow fluid draining from your nose or into your throat    Facial pain or headache that gets worse    Stiff neck    Unusual drowsiness or confusion    Swelling of your forehead or eyelids    Vision problems, such as blurred or double vision    Fever of 100.4 F (38 C) or higher, or as directed by your healthcare provider    Seizure    Breathing problems    Symptoms that don't go away in 10 days  For informational purposes only. Not to replace the advice of your health care provider.  Copyright   2018 [facility]. All rights reserved.        Dear Boris Berumen    After reviewing your responses, I've been able to diagnose you with Acute sinusitis with symptoms > 10 days.      Based on your responses and diagnosis, I have prescribed   Orders Placed This Encounter     amoxicillin-clavulanate (AUGMENTIN) 875-125 MG tablet    to treat your symptoms. I have sent this to your pharmacy.?     It is also important to stay well hydrated, get lots of rest and take over-the-counter decongestants,?tylenol?or ibuprofen if you?are able to?take those medications per your primary care provider to help relieve discomfort.?     It is important that you take?all of?your prescribed medication even if your symptoms are improving after a few doses.? Taking?all of?your medicine helps prevent the symptoms from returning.?     If your symptoms worsen, you develop severe headache, vomiting, high  fever (>102), or are not improving in 7 days, please contact your primary care provider for an appointment or visit any of our convenient Walk-in Care or Urgent Care Centers to be seen which can be found on our website?here.?     Thanks again for choosing?us?as your health care partner,?   ?  Abril Lawson CNP?

## 2023-02-24 RX ORDER — DEXTROAMPHETAMINE SACCHARATE, AMPHETAMINE ASPARTATE, DEXTROAMPHETAMINE SULFATE AND AMPHETAMINE SULFATE 5; 5; 5; 5 MG/1; MG/1; MG/1; MG/1
20 TABLET ORAL 3 TIMES DAILY
Qty: 90 TABLET | Refills: 0 | Status: SHIPPED | OUTPATIENT
Start: 2023-02-24 | End: 2023-04-10

## 2023-04-11 NOTE — TELEPHONE ENCOUNTER
Medications refills:  Adderall, last refilled, 03/10/2023       [place] Prescription Monitoring Program, ( ) referenced. No indication of misuse, or abuse.

## 2023-05-09 ENCOUNTER — TELEPHONE (OUTPATIENT)
Dept: FAMILY MEDICINE | Facility: CLINIC | Age: 38
End: 2023-05-09
Payer: COMMERCIAL

## 2023-05-09 NOTE — TELEPHONE ENCOUNTER
Prior Authorization Retail Medication Request    Medication/Dose: amphetamine-dextroamphetamine (ADDERALL) 20 MG tablet  ICD code (if different than what is on RX):  na  Previously Tried and Failed:  na  Rationale:  na    Insurance Name:  [employer] [id number]  Insurance ID:  [id number]      Pharmacy Information (if different than what is on RX)  Name:  [facility]  Phone:  [facility]

## 2023-05-12 ENCOUNTER — OFFICE VISIT (OUTPATIENT)
Dept: FAMILY MEDICINE | Facility: CLINIC | Age: 38
End: 2023-05-12
Payer: COMMERCIAL

## 2023-05-12 VITALS
WEIGHT: 206 LBS | OXYGEN SATURATION: 97 % | HEART RATE: 91 BPM | TEMPERATURE: 97.3 F | DIASTOLIC BLOOD PRESSURE: 80 MMHG | SYSTOLIC BLOOD PRESSURE: 125 MMHG | BODY MASS INDEX: 31.22 KG/M2 | HEIGHT: 68 IN | RESPIRATION RATE: 20 BRPM

## 2023-05-12 DIAGNOSIS — F90.2 ATTENTION DEFICIT HYPERACTIVITY DISORDER (ADHD), COMBINED TYPE: ICD-10-CM

## 2023-05-12 DIAGNOSIS — Z00.00 ROUTINE GENERAL MEDICAL EXAMINATION AT A HEALTH CARE FACILITY: Primary | ICD-10-CM

## 2023-05-12 DIAGNOSIS — G47.33 OSA (OBSTRUCTIVE SLEEP APNEA): ICD-10-CM

## 2023-05-12 LAB
ALBUMIN SERPL BCG-MCNC: 4.4 G/DL (ref 3.5–5.2)
ALP SERPL-CCNC: 72 U/L (ref 40–129)
ALT SERPL W P-5'-P-CCNC: 26 U/L (ref 10–50)
ANION GAP SERPL CALCULATED.3IONS-SCNC: 10 MMOL/L (ref 7–15)
AST SERPL W P-5'-P-CCNC: 28 U/L (ref 10–50)
BILIRUB SERPL-MCNC: 0.6 MG/DL
BUN SERPL-MCNC: 19.9 MG/DL (ref 6–20)
CALCIUM SERPL-MCNC: 9.5 MG/DL (ref 8.6–10)
CHLORIDE SERPL-SCNC: 105 MMOL/L (ref 98–107)
CHOLEST SERPL-MCNC: 210 MG/DL
CREAT SERPL-MCNC: 0.88 MG/DL (ref 0.67–1.17)
DEPRECATED HCO3 PLAS-SCNC: 27 MMOL/L (ref 22–29)
GFR SERPL CREATININE-BSD FRML MDRD: >90 ML/MIN/1.73M2
GLUCOSE SERPL-MCNC: 106 MG/DL (ref 70–99)
HDLC SERPL-MCNC: 46 MG/DL
LDLC SERPL CALC-MCNC: 145 MG/DL
NONHDLC SERPL-MCNC: 164 MG/DL
POTASSIUM SERPL-SCNC: 5.1 MMOL/L (ref 3.4–5.3)
PROT SERPL-MCNC: 6.6 G/DL (ref 6.4–8.3)
SODIUM SERPL-SCNC: 142 MMOL/L (ref 136–145)
TRIGL SERPL-MCNC: 97 MG/DL

## 2023-05-12 PROCEDURE — 99395 PREV VISIT EST AGE 18-39: CPT | Performed by: FAMILY MEDICINE

## 2023-05-12 PROCEDURE — 80061 LIPID PANEL: CPT | Performed by: FAMILY MEDICINE

## 2023-05-12 PROCEDURE — 36415 COLL VENOUS BLD VENIPUNCTURE: CPT | Performed by: FAMILY MEDICINE

## 2023-05-12 PROCEDURE — 80053 COMPREHEN METABOLIC PANEL: CPT | Performed by: FAMILY MEDICINE

## 2023-05-12 RX ORDER — DEXTROAMPHETAMINE SACCHARATE, AMPHETAMINE ASPARTATE, DEXTROAMPHETAMINE SULFATE AND AMPHETAMINE SULFATE 7.5; 7.5; 7.5; 7.5 MG/1; MG/1; MG/1; MG/1
30 TABLET ORAL 3 TIMES DAILY
Qty: 90 TABLET | Refills: 0 | Status: SHIPPED | OUTPATIENT
Start: 2023-08-01 | End: 2023-06-26

## 2023-05-12 RX ORDER — DEXTROAMPHETAMINE SACCHARATE, AMPHETAMINE ASPARTATE, DEXTROAMPHETAMINE SULFATE AND AMPHETAMINE SULFATE 7.5; 7.5; 7.5; 7.5 MG/1; MG/1; MG/1; MG/1
30 TABLET ORAL 3 TIMES DAILY
Qty: 90 TABLET | Refills: 0 | Status: SHIPPED | OUTPATIENT
Start: 2023-06-01 | End: 2023-05-25

## 2023-05-12 RX ORDER — DEXTROAMPHETAMINE SACCHARATE, AMPHETAMINE ASPARTATE, DEXTROAMPHETAMINE SULFATE AND AMPHETAMINE SULFATE 7.5; 7.5; 7.5; 7.5 MG/1; MG/1; MG/1; MG/1
30 TABLET ORAL 3 TIMES DAILY
Qty: 90 TABLET | Refills: 0 | Status: SHIPPED | OUTPATIENT
Start: 2023-07-03 | End: 2023-06-26

## 2023-05-12 ASSESSMENT — ENCOUNTER SYMPTOMS
MYALGIAS: 0
HEADACHES: 1
CHILLS: 0
PALPITATIONS: 0
ARTHRALGIAS: 0
SORE THROAT: 0
DIZZINESS: 0
FREQUENCY: 0
NAUSEA: 0
HEMATURIA: 0
HEARTBURN: 0
HEMATOCHEZIA: 0
JOINT SWELLING: 0
DYSURIA: 0
SHORTNESS OF BREATH: 0
NERVOUS/ANXIOUS: 0
PARESTHESIAS: 0
COUGH: 0
CONSTIPATION: 0
DIARRHEA: 0
WEAKNESS: 0
ALLERGIC/IMMUNOLOGIC NEGATIVE: 1
ABDOMINAL PAIN: 0
FEVER: 0
HEMATOLOGIC/LYMPHATIC NEGATIVE: 1
ENDOCRINE NEGATIVE: 1
EYE PAIN: 0

## 2023-05-12 ASSESSMENT — PAIN SCALES - GENERAL: PAINLEVEL: NO PAIN (0)

## 2023-05-12 NOTE — PROGRESS NOTES
SUBJECTIVE:   CC: Boris is an 37 year old who presents for preventative health visit.   Patient with attention deficit hyperactivity disorder.  He has been on 20 mg of Adderall 3 times a day.  He work at the  [employer] he has been working 10 to 12 hours for 5 to 6 days/week.  His current medication less effective, he has no side effect.  Denies depression, alcohol abuse or drug symptoms.        5/12/2023     8:04 AM   Additional Questions   Roomed by Mita Shin CMA   Accompanied by Self         5/12/2023     8:04 AM   Patient Reported Additional Medications   Patient reports taking the following new medications No     Patient has been advised of split billing requirements and indicates understanding: Yes  Healthy Habits:     Getting at least 3 servings of Calcium per day:  Yes    Bi-annual eye exam:  NO    Dental care twice a year:  NO    Sleep apnea or symptoms of sleep apnea:  Sleep apnea    Diet:  Regular (no restrictions) and Breakfast skipped    Frequency of exercise:  6-7 days/week    Duration of exercise:  Greater than 60 minutes    Taking medications regularly:  Yes    Barriers to taking medications:  None    Medication side effects:  None    PHQ-2 Total Score: 0    Additional concerns today:  Yes              Today's PHQ-2 Score:       5/12/2023     7:24 AM   PHQ-2 ( 1999 [employer])   Q1: Little interest or pleasure in doing things 0   Q2: Feeling down, depressed or hopeless 0   PHQ-2 Score 0   Q1: Little interest or pleasure in doing things Not at all    Not at all   Q2: Feeling down, depressed or hopeless Not at all    Not at all   PHQ-2 Score 0    0       Have you ever done Advance Care Planning? (For example, a Health Directive, POLST, or a discussion with a medical provider or your loved ones about your wishes): No, advance care planning information given to patient to review.  Patient declined advance care planning discussion at this time.    Social History     Tobacco Use     Smoking  status: Some Days     Packs/day: 0.50     Years: 10.00     Pack years: 5.00     Types: Cigarettes     Start date: 1/1/2001     Passive exposure: Current     Smokeless tobacco: Never   Vaping Use     Vaping status: Never Used     Passive vaping exposure: Yes   Substance Use Topics     Alcohol use: Yes     Comment: couple drinks per week socially             5/12/2023     7:24 AM   Alcohol Use   Prescreen: >3 drinks/day or >7 drinks/week? No          View : No data to display.                Last PSA: No results found for: PSA    Reviewed orders with patient. Reviewed health maintenance and updated orders accordingly - Yes  Lab work is in process  Labs reviewed in EPIC  BP Readings from Last 3 Encounters:   05/12/23 125/80   05/10/22 124/80   10/08/21 122/76    Wt Readings from Last 3 Encounters:   05/12/23 93.4 kg (206 lb)   05/10/22 97.5 kg (215 lb)   10/08/21 97.5 kg (215 lb)                    Reviewed and updated as needed this visit by clinical staff   Tobacco  Allergies  Meds   Med Hx  Surg Hx  Fam Hx  Soc Hx        Reviewed and updated as needed this visit by Provider                 Past Medical History:   Diagnosis Date     Attention deficit hyperactivity disorder (ADHD), combined type 2002     Chronic migraine without aura without status migrainosus, not intractable       Past Surgical History:   Procedure Laterality Date     ABDOMEN SURGERY  1983    Pyloric stenosis     HC TOOTH EXTRACTION W/FORCEP      age 10     ORTHOPEDIC SURGERY  2018 & 2019    R Knee MPFL reconstruction. R shoulder labrum & rotator cuff     pyloric stenosis surgical repair as a baby       TONSILLECTOMY & ADENOIDECTOMY      childhood     OB History   No obstetric history on file.       Review of Systems   Constitutional: Negative for chills and fever.   HENT: Negative for congestion, ear pain, hearing loss and sore throat.    Eyes: Negative for pain and visual disturbance.   Respiratory: Negative for cough and shortness of  "breath.    Cardiovascular: Negative for chest pain, palpitations and peripheral edema.   Gastrointestinal: Negative for abdominal pain, constipation, diarrhea, heartburn, hematochezia and nausea.   Endocrine: Negative.    Genitourinary: Positive for impotence. Negative for dysuria, frequency, genital sores, hematuria, penile discharge and urgency.   Musculoskeletal: Negative for arthralgias, joint swelling and myalgias.   Skin: Negative for rash.   Allergic/Immunologic: Negative.    Neurological: Positive for headaches. Negative for dizziness, weakness and paresthesias.   Hematological: Negative.    Psychiatric/Behavioral: Negative for mood changes. The patient is not nervous/anxious.      CONSTITUTIONAL: NEGATIVE for fever, chills, change in weight  INTEGUMENTARY/SKIN: NEGATIVE for worrisome rashes, moles or lesions  EYES: NEGATIVE for vision changes or irritation  ENT: NEGATIVE for ear, mouth and throat problems  RESP: NEGATIVE for significant cough or SOB  CV: NEGATIVE for chest pain, palpitations or peripheral edema  GI: NEGATIVE for nausea, abdominal pain, heartburn, or change in bowel habits   male: negative for dysuria, hematuria, decreased urinary stream, erectile dysfunction, urethral discharge  MUSCULOSKELETAL: NEGATIVE for significant arthralgias or myalgia  NEURO: NEGATIVE for weakness, dizziness or paresthesias  ENDOCRINE: NEGATIVE for temperature intolerance, skin/hair changes  HEME/ALLERGY/IMMUNE: NEGATIVE for bleeding problems  PSYCHIATRIC: NEGATIVE for changes in mood or affect    OBJECTIVE:   /80 (BP Location: Right arm, Patient Position: Chair, Cuff Size: Adult Large)   Pulse 91   Temp 97.3  F (36.3  C) (Tympanic)   Resp 20   Ht 1.738 m (5' 8.41\")   Wt 93.4 kg (206 lb)   SpO2 97%   BMI 30.95 kg/m      Physical Exam  GENERAL: healthy, alert and no distress  EYES: Eyes grossly normal to inspection, PERRL and conjunctivae and sclerae normal  HENT: ear canals and TM's normal, nose and " mouth without ulcers or lesions  NECK: no adenopathy, no asymmetry, masses, or scars and thyroid normal to palpation  RESP: lungs clear to auscultation - no rales, rhonchi or wheezes  CV: regular rate and rhythm, normal S1 S2, no S3 or S4, no murmur, click or rub, no peripheral edema and peripheral pulses strong  ABDOMEN: soft, nontender, no hepatosplenomegaly, no masses and bowel sounds normal  MS: no gross musculoskeletal defects noted, no edema  SKIN: no suspicious lesions or rashes  NEURO: Normal strength and tone, mentation intact and speech normal  PSYCH: mentation appears normal, affect normal/bright    Diagnostic Test Results:    Orders Placed This Encounter   Procedures     Lipid panel reflex to direct LDL Non-fasting     Comprehensive metabolic panel (BMP + Alb, Alk Phos, ALT, AST, Total. Bili, TP)       ASSESSMENT/PLAN:   (Z00.00) Routine general medical examination at a health care facility  (primary encounter diagnosis)  Comment: normal exam  Plan: Lipid panel reflex to direct LDL Non-fasting,         Comprehensive metabolic panel (BMP + Alb, Alk         Phos, ALT, AST, Total. Bili, TP)         Discussed diet, exercise, wellness and other preventive recommendations related to health maintenance.   Follow up as needed for acute issues.    Physical exam recommended in one year.       (F90.2) Attention deficit hyperactivity disorder (ADHD), combined type  Comment: current dose less effective.  Plan: amphetamine-dextroamphetamine (ADDERALL) 30 MG         tablet, amphetamine-dextroamphetamine         (ADDERALL) 30 MG tablet,         amphetamine-dextroamphetamine (ADDERALL) 30 MG         tablet        Increase dose to 30 mg, take up to 3 times a day.  May deescalates to lower dose, after the busy season is offer.    (G47.33) GUSTAVO (obstructive sleep apnea)  Comment:   Plan: on CPAP    Patient has been advised of split billing requirements and indicates understanding: Yes      COUNSELING:   Reviewed preventive  health counseling, as reflected in patient instructions       Regular exercise       Healthy diet/nutrition       Immunizations    Declined: Covid-19 due to Other : not today          He reports that he has been smoking cigarettes. He started smoking about 22 years ago. He has a 5.00 pack-year smoking history. He has been exposed to tobacco smoke. He has never used smokeless tobacco.  Nicotine/Tobacco Cessation Plan:   Self help information given to patient            Lanie Calixto MD  [facility]

## 2023-05-15 NOTE — TELEPHONE ENCOUNTER
The 20mg rx was deactivated on chart. If a pa is still desired, please route encounter back to the pa team.

## 2023-05-25 DIAGNOSIS — F90.2 ATTENTION DEFICIT HYPERACTIVITY DISORDER (ADHD), COMBINED TYPE: ICD-10-CM

## 2023-05-25 RX ORDER — DEXTROAMPHETAMINE SACCHARATE, AMPHETAMINE ASPARTATE, DEXTROAMPHETAMINE SULFATE AND AMPHETAMINE SULFATE 7.5; 7.5; 7.5; 7.5 MG/1; MG/1; MG/1; MG/1
30 TABLET ORAL 3 TIMES DAILY
Qty: 90 TABLET | Refills: 0 | Status: SHIPPED | OUTPATIENT
Start: 2023-05-25 | End: 2023-06-26

## 2023-05-25 NOTE — TELEPHONE ENCOUNTER
Routing to PCP    Patient states dates for Adderall refill are off due to dosage change.    Patient will be out tomorrow.    RN pended.    Marquez Herrera, RN, BSN, PHN  [facility]

## 2023-06-26 ENCOUNTER — VIRTUAL VISIT (OUTPATIENT)
Dept: FAMILY MEDICINE | Facility: CLINIC | Age: 38
End: 2023-06-26
Payer: COMMERCIAL

## 2023-06-26 DIAGNOSIS — N52.8 OTHER MALE ERECTILE DYSFUNCTION: ICD-10-CM

## 2023-06-26 DIAGNOSIS — F90.2 ATTENTION DEFICIT HYPERACTIVITY DISORDER (ADHD), COMBINED TYPE: Primary | ICD-10-CM

## 2023-06-26 PROCEDURE — 99214 OFFICE O/P EST MOD 30 MIN: CPT | Mod: VID | Performed by: FAMILY MEDICINE

## 2023-06-26 RX ORDER — DEXTROAMPHETAMINE SACCHARATE, AMPHETAMINE ASPARTATE, DEXTROAMPHETAMINE SULFATE AND AMPHETAMINE SULFATE 7.5; 7.5; 7.5; 7.5 MG/1; MG/1; MG/1; MG/1
30 TABLET ORAL 3 TIMES DAILY
Qty: 90 TABLET | Refills: 0 | Status: SHIPPED | OUTPATIENT
Start: 2023-09-22 | End: 2023-12-04

## 2023-06-26 RX ORDER — DEXTROAMPHETAMINE SACCHARATE, AMPHETAMINE ASPARTATE, DEXTROAMPHETAMINE SULFATE AND AMPHETAMINE SULFATE 7.5; 7.5; 7.5; 7.5 MG/1; MG/1; MG/1; MG/1
30 TABLET ORAL 3 TIMES DAILY
Qty: 90 TABLET | Refills: 0 | Status: SHIPPED | OUTPATIENT
Start: 2023-08-21 | End: 2024-01-02

## 2023-06-26 RX ORDER — SILDENAFIL 100 MG/1
TABLET, FILM COATED ORAL
Qty: 20 TABLET | Refills: 5 | Status: SHIPPED | OUTPATIENT
Start: 2023-06-26 | End: 2023-12-22

## 2023-06-26 RX ORDER — DEXTROAMPHETAMINE SACCHARATE, AMPHETAMINE ASPARTATE, DEXTROAMPHETAMINE SULFATE AND AMPHETAMINE SULFATE 7.5; 7.5; 7.5; 7.5 MG/1; MG/1; MG/1; MG/1
30 TABLET ORAL 3 TIMES DAILY
Qty: 90 TABLET | Refills: 0 | Status: SHIPPED | OUTPATIENT
Start: 2023-07-21 | End: 2024-03-04

## 2023-06-26 NOTE — PROGRESS NOTES
"Boris is a 37 year old who is being evaluated via a billable video visit.      How would you like to obtain your AVS? MyChart  If the video visit is dropped, the invitation should be resent by: Text to cell phone: [phone]  Will anyone else be joining your video visit? No      Assessment & Plan     Attention deficit hyperactivity disorder (ADHD), combined type  Doing well with increased dosage,   No side effect, weight remain stable.  - amphetamine-dextroamphetamine (ADDERALL) 30 MG tablet; Take 1 tablet (30 mg) by mouth 3 times daily  - amphetamine-dextroamphetamine (ADDERALL) 30 MG tablet; Take 1 tablet (30 mg) by mouth 3 times daily  - amphetamine-dextroamphetamine (ADDERALL) 30 MG tablet; Take 1 tablet (30 mg) by mouth 3 times daily    Other male erectile dysfunction  Discussed with patient, could be multiple factorial.  Advised patient supportive care, advised patient to quit smoking.  Advised patient on diet and reduce stress, eat healthy, sleep right,    - sildenafil (VIAGRA) 100 MG tablet; 1/2 to one full tab daily as needed.       BMI:   Estimated body mass index is 30.95 kg/m  as calculated from the following:    Height as of 5/12/23: 1.738 m (5' 8.41\").    Weight as of 5/12/23: 93.4 kg (206 lb).   Weight management plan: Discussed healthy diet and exercise guidelines    Work on weight loss  Regular exercise    Lanie Calixto MD  [facility]    Subjective   Boris is a 37 year old, presenting for the following health issues:  Follow Up (ADHD)        5/12/2023     8:04 AM   Additional Questions   Roomed by Mita Shin CMA   Accompanied by Self     History of Present Illness       Reason for visit:  Follow up for adhd. Also question regarding physical stamina, energy levels    He eats 2-3 servings of fruits and vegetables daily.He consumes 1 sweetened beverage(s) daily.He exercises with enough effort to increase his heart rate 60 or more minutes per day.  He exercises with " enough effort to increase his heart rate 7 days per week.   He is taking medications regularly.     Review of Systems   Constitutional, HEENT, cardiovascular, pulmonary, GI, , musculoskeletal, neuro, skin, endocrine and psych systems are negative, except as otherwise noted.      Objective           Vitals:  No vitals were obtained today due to virtual visit.    Physical Exam   GENERAL: Healthy, alert and no distress  EYES: Eyes grossly normal to inspection.  No discharge or erythema, or obvious scleral/conjunctival abnormalities.  RESP: No audible wheeze, cough, or visible cyanosis.  No visible retractions or increased work of breathing.    SKIN: Visible skin clear. No significant rash, abnormal pigmentation or lesions.  NEURO: Cranial nerves grossly intact.  Mentation and speech appropriate for age.  PSYCH: Mentation appears normal, affect normal/bright, judgement and insight intact, normal speech and appearance well-groomed.    Orders Placed This Encounter   Procedures     REVIEW OF HEALTH MAINTENANCE PROTOCOL ORDERS       Lanie Calixto MD        Video-Visit Details    Type of service:  Video Visit     Originating Location (pt. Location): Other at work  Distant Location (provider location):  On-site  Platform used for Video Visit: Wenwo

## 2023-09-13 ENCOUNTER — TELEPHONE (OUTPATIENT)
Dept: FAMILY MEDICINE | Facility: CLINIC | Age: 38
End: 2023-09-13

## 2023-09-13 NOTE — TELEPHONE ENCOUNTER
Prior Authorization Retail Medication Request    Medication/Dose: amphetamine-dextroamphetamine (ADDERALL) 30 MG tablet    ICD code (if different than what is on RX):  na  Previously Tried and Failed:  na  Rationale:  na    Insurance Name:  na  Insurance ID:  na      Pharmacy Information (if different than what is on RX)  Name:  same  Phone:  same    Pharmacy Comments:  ALTERNATIVE REQUESTED: NEEDS PA

## 2023-09-15 NOTE — TELEPHONE ENCOUNTER
Central Prior Authorization Team  Phone: [phone]    PA Initiation    Medication: AMPHETAMINE-DEXTROAMPHETAMINE 30 MG PO TABS  Insurance Company: [employer] - Phone [phone] Fax [phone]  Pharmacy Filling the Rx: [id number] - [place], [place] - [address] AT COR[address] [facility]  Filling Pharmacy Phone: [phone]  Filling Pharmacy Fax:    Start Date: 9/15/2023

## 2023-09-18 NOTE — TELEPHONE ENCOUNTER
Central Prior Authorization Team  Phone: [phone]    Prior Authorization Approval    Medication: AMPHETAMINE-DEXTROAMPHETAMINE 30 MG PO TABS  Authorization Effective Date: 9/16/2023  Authorization Expiration Date: 9/15/2024  Approved Dose/Quantity: 60mg/day  Reference #:     Insurance Company: [employer] - Phone [phone] Fax [phone]  Expected CoPay:       CoPay Card Available:      Financial Assistance Needed:   Which Pharmacy is filling the prescription: [id number] - [place], [place] - [address] AT [address] OF [facility]  Pharmacy Notified:    Patient Notified:    A

## 2023-09-21 ENCOUNTER — MYC MEDICAL ADVICE (OUTPATIENT)
Dept: FAMILY MEDICINE | Facility: CLINIC | Age: 38
End: 2023-09-21
Payer: COMMERCIAL

## 2023-09-21 NOTE — TELEPHONE ENCOUNTER
Please see mychart message     Pharmacy is telling patient no refills on file       Angelic Yen    [facility]

## 2023-09-21 NOTE — TELEPHONE ENCOUNTER
RN replied to patient via [employer]. See message for details.     Marquez Herrera RN, BSN, PHN  [facility]: [place]

## 2023-12-04 ENCOUNTER — TELEPHONE (OUTPATIENT)
Dept: FAMILY MEDICINE | Facility: CLINIC | Age: 38
End: 2023-12-04
Payer: COMMERCIAL

## 2023-12-04 DIAGNOSIS — F90.2 ATTENTION DEFICIT HYPERACTIVITY DISORDER (ADHD), COMBINED TYPE: ICD-10-CM

## 2023-12-04 RX ORDER — DEXTROAMPHETAMINE SACCHARATE, AMPHETAMINE ASPARTATE, DEXTROAMPHETAMINE SULFATE AND AMPHETAMINE SULFATE 7.5; 7.5; 7.5; 7.5 MG/1; MG/1; MG/1; MG/1
30 TABLET ORAL 3 TIMES DAILY
Qty: 90 TABLET | Refills: 0 | Status: SHIPPED | OUTPATIENT
Start: 2023-12-04 | End: 2023-12-06

## 2023-12-06 DIAGNOSIS — F90.2 ATTENTION DEFICIT HYPERACTIVITY DISORDER (ADHD), COMBINED TYPE: ICD-10-CM

## 2023-12-06 RX ORDER — DEXTROAMPHETAMINE SACCHARATE, AMPHETAMINE ASPARTATE, DEXTROAMPHETAMINE SULFATE AND AMPHETAMINE SULFATE 5; 5; 5; 5 MG/1; MG/1; MG/1; MG/1
90 TABLET ORAL DAILY
Qty: 135 TABLET | Refills: 0 | Status: SHIPPED | OUTPATIENT
Start: 2023-12-06 | End: 2024-01-06

## 2023-12-06 NOTE — TELEPHONE ENCOUNTER
Routing to PCP      Medication pended.  Pt was taking a total of 90 mg adderall daily.    Since pharmacy only has 20 mg in stock wrote prescription to take 4.5 tabs daily in 3 divided doses        IRENA Dougherty    Triage Nurse  [facility]

## 2023-12-06 NOTE — TELEPHONE ENCOUNTER
Medication Question or Refill    Contacts         Type Contact Phone/Fax    12/06/2023 12:36 PM CST Phone (Incoming) Boris Berumen (Self) [phone] (M)            What medication are you calling about (include dose and sig)?:   amphetamine-dextroamphetamine (ADDERALL) 30 MG tablet  30 mg, 3 TIMES DAILY       Preferred Pharmacy:   [id number] [address] AT [facility] & [address] [address]  Phone: [phone] Fax: [phone]      Controlled Substance Agreement on file:   CSA -- Patient Level:    CSA: None found at the patient level.       Who prescribed the medication?: Lanie Calixto MD     Do you need a refill? Yes    When did you use the medication last? 36hrs from 12:45p on 12/6    Patient offered an appointment? No    Do you have any questions or concerns?  Yes: Can you send over the script over for 20mg. Original pharm is on back order till 2024      Could we send this information to you in [employer] or would you prefer to receive a phone call?:   Patient would prefer a phone call   Okay to leave a detailed message?: Yes at Cell number on file:    Telephone Information:   Mobile [phone]

## 2023-12-22 ENCOUNTER — MYC REFILL (OUTPATIENT)
Dept: FAMILY MEDICINE | Facility: CLINIC | Age: 38
End: 2023-12-22
Payer: COMMERCIAL

## 2023-12-22 DIAGNOSIS — N52.8 OTHER MALE ERECTILE DYSFUNCTION: ICD-10-CM

## 2023-12-26 RX ORDER — SILDENAFIL 100 MG/1
TABLET, FILM COATED ORAL
Qty: 20 TABLET | Refills: 1 | Status: SHIPPED | OUTPATIENT
Start: 2023-12-26 | End: 2024-01-02

## 2024-01-02 ENCOUNTER — VIRTUAL VISIT (OUTPATIENT)
Dept: FAMILY MEDICINE | Facility: CLINIC | Age: 39
End: 2024-01-02
Payer: COMMERCIAL

## 2024-01-02 DIAGNOSIS — F90.2 ATTENTION DEFICIT HYPERACTIVITY DISORDER (ADHD), COMBINED TYPE: ICD-10-CM

## 2024-01-02 DIAGNOSIS — N52.8 OTHER MALE ERECTILE DYSFUNCTION: ICD-10-CM

## 2024-01-02 PROCEDURE — 99214 OFFICE O/P EST MOD 30 MIN: CPT | Mod: 95 | Performed by: FAMILY MEDICINE

## 2024-01-02 RX ORDER — DEXTROAMPHETAMINE SACCHARATE, AMPHETAMINE ASPARTATE, DEXTROAMPHETAMINE SULFATE AND AMPHETAMINE SULFATE 5; 5; 5; 5 MG/1; MG/1; MG/1; MG/1
90 TABLET ORAL DAILY
Qty: 135 TABLET | Refills: 0 | Status: CANCELLED | OUTPATIENT
Start: 2024-01-02

## 2024-01-02 RX ORDER — TADALAFIL 20 MG/1
20 TABLET ORAL EVERY 24 HOURS
Qty: 20 TABLET | Refills: 11 | Status: SHIPPED | OUTPATIENT
Start: 2024-01-02

## 2024-01-02 RX ORDER — LISDEXAMFETAMINE DIMESYLATE 60 MG/1
60 CAPSULE ORAL EVERY MORNING
Qty: 30 CAPSULE | Refills: 0 | Status: SHIPPED | OUTPATIENT
Start: 2024-01-02 | End: 2024-02-01

## 2024-01-02 RX ORDER — DEXTROAMPHETAMINE SACCHARATE, AMPHETAMINE ASPARTATE, DEXTROAMPHETAMINE SULFATE AND AMPHETAMINE SULFATE 7.5; 7.5; 7.5; 7.5 MG/1; MG/1; MG/1; MG/1
30 TABLET ORAL 3 TIMES DAILY
Qty: 90 TABLET | Refills: 0 | Status: CANCELLED | OUTPATIENT
Start: 2024-01-02

## 2024-01-02 NOTE — PROGRESS NOTES
"Boris is a 38 year old who is being evaluated via a billable video visit.      How would you like to obtain your AVS? MyChart  If the video visit is dropped, the invitation should be resent by: Text to cell phone: [phone]  Will anyone else be joining your video visit? No          Assessment & Plan     Attention deficit hyperactivity disorder (ADHD), combined type  Continue to work long hours,  His current medication has been less effective.  He would like to try something different.  I did send up new prescription for Vyvanse 60 mg once a day.  Discussed with patient if that does not help or is not covered by his insurance.  Other possibility to put him back on Adderall XR in the morning, and short acting in the afternoon.    - lisdexamfetamine (VYVANSE) 60 MG capsule; Take 1 capsule (60 mg) by mouth every morning    Other male erectile dysfunction  Use as needed  - tadalafil (ADCIRCA/CIALIS) 20 MG tablet; Take 1 tablet (20 mg) by mouth every 24 hours    BMI:   Estimated body mass index is 30.95 kg/m  as calculated from the following:    Height as of 5/12/23: 1.738 m (5' 8.41\").    Weight as of 5/12/23: 93.4 kg (206 lb).   Weight management plan: Discussed healthy diet and exercise guidelines    Work on weight loss  Regular exercise        Subjective   Boris is a 38 year old, presenting for the following health issues:  Recheck Medication  Patient reports his current dosage of Adderall has been less effective he has been taking up to 90 mg a day.  He has no side effect.  He does work long hours at least 12 to 13 hours a day, as .    He denies side effect with the medication.  Denies depression, denies suicidal thoughts or ideation, denies alcohol abuse or drugs abuse.    History of erectile dysfunctions.    History of Present Illness       Reason for visit:  Medications Review    He eats 4 or more servings of fruits and vegetables daily.He consumes 2 sweetened beverage(s) daily.He exercises with " enough effort to increase his heart rate 60 or more minutes per day.  He exercises with enough effort to increase his heart rate 6 days per week.   He is taking medications regularly.       Medication Followup of Adderall  Taking Medication as prescribed: yes  Side Effects:  None  Medication Helping Symptoms:  yes        Review of Systems   Constitutional, HEENT, cardiovascular, pulmonary, GI, , musculoskeletal, neuro, skin, endocrine and psych systems are negative, except as otherwise noted.      Objective           Vitals:  No vitals were obtained today due to virtual visit.    Physical Exam   GENERAL: Healthy, alert and no distress  EYES: Eyes grossly normal to inspection.  No discharge or erythema, or obvious scleral/conjunctival abnormalities.  RESP: No audible wheeze, cough, or visible cyanosis.  No visible retractions or increased work of breathing.    SKIN: Visible skin clear. No significant rash, abnormal pigmentation or lesions.  NEURO: Cranial nerves grossly intact.  Mentation and speech appropriate for age.  PSYCH: Mentation appears normal, affect normal/bright, judgement and insight intact, normal speech and appearance well-groomed.            Lanie Calixto MD        Video-Visit Details    Type of service:  Video Visit     Originating Location (pt. Location): Home    Distant Location (provider location):  On-site  Platform used for Video Visit: [employer]

## 2024-01-06 ENCOUNTER — MYC REFILL (OUTPATIENT)
Dept: FAMILY MEDICINE | Facility: CLINIC | Age: 39
End: 2024-01-06

## 2024-01-06 DIAGNOSIS — F90.2 ATTENTION DEFICIT HYPERACTIVITY DISORDER (ADHD), COMBINED TYPE: ICD-10-CM

## 2024-01-08 RX ORDER — DEXTROAMPHETAMINE SACCHARATE, AMPHETAMINE ASPARTATE, DEXTROAMPHETAMINE SULFATE AND AMPHETAMINE SULFATE 5; 5; 5; 5 MG/1; MG/1; MG/1; MG/1
90 TABLET ORAL DAILY
Qty: 135 TABLET | Refills: 0 | Status: SHIPPED | OUTPATIENT
Start: 2024-01-08 | End: 2024-03-04

## 2024-01-27 ENCOUNTER — MYC REFILL (OUTPATIENT)
Dept: FAMILY MEDICINE | Facility: CLINIC | Age: 39
End: 2024-01-27

## 2024-01-27 DIAGNOSIS — N52.8 OTHER MALE ERECTILE DYSFUNCTION: ICD-10-CM

## 2024-01-29 RX ORDER — TADALAFIL 20 MG/1
20 TABLET ORAL EVERY 24 HOURS
Qty: 20 TABLET | Refills: 11 | OUTPATIENT
Start: 2024-01-29

## 2024-02-01 ENCOUNTER — MYC REFILL (OUTPATIENT)
Dept: FAMILY MEDICINE | Facility: CLINIC | Age: 39
End: 2024-02-01

## 2024-02-01 DIAGNOSIS — F90.2 ATTENTION DEFICIT HYPERACTIVITY DISORDER (ADHD), COMBINED TYPE: ICD-10-CM

## 2024-02-02 RX ORDER — LISDEXAMFETAMINE DIMESYLATE 60 MG/1
60 CAPSULE ORAL EVERY MORNING
Qty: 30 CAPSULE | Refills: 0 | Status: SHIPPED | OUTPATIENT
Start: 2024-02-02 | End: 2024-03-04

## 2024-03-04 ENCOUNTER — MYC REFILL (OUTPATIENT)
Dept: FAMILY MEDICINE | Facility: CLINIC | Age: 39
End: 2024-03-04

## 2024-03-04 DIAGNOSIS — F90.2 ATTENTION DEFICIT HYPERACTIVITY DISORDER (ADHD), COMBINED TYPE: ICD-10-CM

## 2024-03-04 RX ORDER — LISDEXAMFETAMINE DIMESYLATE 70 MG/1
70 CAPSULE ORAL DAILY
Qty: 30 CAPSULE | Refills: 0 | Status: SHIPPED | OUTPATIENT
Start: 2024-04-04 | End: 2024-03-27

## 2024-03-04 RX ORDER — LISDEXAMFETAMINE DIMESYLATE 70 MG/1
70 CAPSULE ORAL DAILY
Qty: 30 CAPSULE | Refills: 0 | Status: SHIPPED | OUTPATIENT
Start: 2024-03-04 | End: 2024-03-27

## 2024-03-04 RX ORDER — DEXTROAMPHETAMINE SACCHARATE, AMPHETAMINE ASPARTATE, DEXTROAMPHETAMINE SULFATE AND AMPHETAMINE SULFATE 5; 5; 5; 5 MG/1; MG/1; MG/1; MG/1
90 TABLET ORAL DAILY
Qty: 135 TABLET | Refills: 0 | Status: CANCELLED | OUTPATIENT
Start: 2024-03-04

## 2024-03-04 RX ORDER — LISDEXAMFETAMINE DIMESYLATE 70 MG/1
70 CAPSULE ORAL DAILY
Qty: 30 CAPSULE | Refills: 0 | Status: SHIPPED | OUTPATIENT
Start: 2024-05-05 | End: 2024-03-27

## 2024-03-04 RX ORDER — LISDEXAMFETAMINE DIMESYLATE 60 MG/1
60 CAPSULE ORAL EVERY MORNING
Qty: 30 CAPSULE | Refills: 0 | Status: CANCELLED | OUTPATIENT
Start: 2024-03-04

## 2024-03-04 NOTE — TELEPHONE ENCOUNTER
Please call pt and ask with medicine he need a refills    Vyvanse or adderall,  We Can't do both    We could increase Vyvanse to 70 mg or continue with Adderall    thanks

## 2024-03-04 NOTE — TELEPHONE ENCOUNTER
Routing to Dr. Calixto    RN called patient    He would like to do 70 mg of vyvanse    Pharmacy: [id number] - [place], [place] - [address] AT [facility] & [address] Mcgarry RN

## 2024-03-04 NOTE — TELEPHONE ENCOUNTER
Sent Vyvanse, 70 mg, one tab a daily  Sent for the next 3 months    Follow up in 3 months for routine annual exam    Thanks

## 2024-03-13 ENCOUNTER — TELEPHONE (OUTPATIENT)
Dept: FAMILY MEDICINE | Facility: CLINIC | Age: 39
End: 2024-03-13

## 2024-03-13 NOTE — TELEPHONE ENCOUNTER
Patient Quality Outreach    Patient is due for the following:   Physical  - Due after 5/12/24    Next Steps:   Schedule a Adult Preventative    Type of outreach:    Sent [email or web address] message.      Questions for provider review:    None           Yin Dominguez CMA  Chart routed to Care Team.

## 2024-03-27 ENCOUNTER — TELEPHONE (OUTPATIENT)
Dept: FAMILY MEDICINE | Facility: CLINIC | Age: 39
End: 2024-03-27

## 2024-03-27 DIAGNOSIS — F90.2 ATTENTION DEFICIT HYPERACTIVITY DISORDER (ADHD), COMBINED TYPE: Primary | ICD-10-CM

## 2024-03-27 RX ORDER — DEXTROAMPHETAMINE SACCHARATE, AMPHETAMINE ASPARTATE MONOHYDRATE, DEXTROAMPHETAMINE SULFATE AND AMPHETAMINE SULFATE 7.5; 7.5; 7.5; 7.5 MG/1; MG/1; MG/1; MG/1
30 CAPSULE, EXTENDED RELEASE ORAL DAILY
Qty: 30 CAPSULE | Refills: 0 | Status: SHIPPED | OUTPATIENT
Start: 2024-03-27 | End: 2024-04-05 | Stop reason: ALTCHOICE

## 2024-03-27 RX ORDER — DEXTROAMPHETAMINE SACCHARATE, AMPHETAMINE ASPARTATE, DEXTROAMPHETAMINE SULFATE AND AMPHETAMINE SULFATE 5; 5; 5; 5 MG/1; MG/1; MG/1; MG/1
TABLET ORAL
Qty: 30 TABLET | Refills: 0 | Status: SHIPPED | OUTPATIENT
Start: 2024-03-27 | End: 2024-04-05 | Stop reason: ALTCHOICE

## 2024-03-27 NOTE — TELEPHONE ENCOUNTER
Reason for Call:  Other     Detailed comments: Wants PCP to know Bivance is NOT working long enoughwants to switch back to Aderall    Phone Number Patient can be reached at: Cell number on file:    Telephone Information:   Mobile [phone]       Best Time: any 730am -8pm    Can we leave a detailed message on this number? YES    Call taken on 3/27/2024 at 12:26 PM by Noreen Fontaine

## 2024-03-27 NOTE — TELEPHONE ENCOUNTER
Routing message to provider.    Patient needing ADHD medication refilled as he will run out before next appointment on 4/5/24.     Would like Adderall as Vyvanse not as effective as it wears off. Was seen 1/2/24 where medication was changed.    Attention deficit hyperactivity disorder (ADHD), combined type  Continue to work long hours,  His current medication has been less effective.  He would like to try something different.  I did send up new prescription for Vyvanse 60 mg once a day.  Discussed with patient if that does not help or is not covered by his insurance.  Other possibility to put him back on Adderall XR in the morning, and short acting in the afternoon.       Pharmacy pended.    Christine M Klisch, RN

## 2024-03-28 NOTE — TELEPHONE ENCOUNTER
Called patient and left a detailed voicemail message that prescriptions have been sent to pharmacy.    CAROLYNE Lind  [facility]

## 2024-04-05 ENCOUNTER — VIRTUAL VISIT (OUTPATIENT)
Dept: FAMILY MEDICINE | Facility: CLINIC | Age: 39
End: 2024-04-05
Payer: COMMERCIAL

## 2024-04-05 DIAGNOSIS — F90.2 ATTENTION DEFICIT HYPERACTIVITY DISORDER (ADHD), COMBINED TYPE: Primary | ICD-10-CM

## 2024-04-05 DIAGNOSIS — J01.10 ACUTE NON-RECURRENT FRONTAL SINUSITIS: ICD-10-CM

## 2024-04-05 PROCEDURE — 99442 PR PHYSICIAN TELEPHONE EVALUATION 11-20 MIN: CPT | Mod: 93 | Performed by: FAMILY MEDICINE

## 2024-04-05 RX ORDER — LISDEXAMFETAMINE DIMESYLATE 70 MG/1
70 CAPSULE ORAL DAILY
Qty: 30 CAPSULE | Refills: 0 | Status: SHIPPED | OUTPATIENT
Start: 2024-06-21 | End: 2024-07-21

## 2024-04-05 RX ORDER — DEXTROAMPHETAMINE SACCHARATE, AMPHETAMINE ASPARTATE MONOHYDRATE, DEXTROAMPHETAMINE SULFATE AND AMPHETAMINE SULFATE 7.5; 7.5; 7.5; 7.5 MG/1; MG/1; MG/1; MG/1
30 CAPSULE, EXTENDED RELEASE ORAL DAILY
Qty: 30 CAPSULE | Refills: 0 | Status: CANCELLED | OUTPATIENT
Start: 2024-04-05

## 2024-04-05 RX ORDER — DEXTROAMPHETAMINE SACCHARATE, AMPHETAMINE ASPARTATE, DEXTROAMPHETAMINE SULFATE AND AMPHETAMINE SULFATE 5; 5; 5; 5 MG/1; MG/1; MG/1; MG/1
TABLET ORAL
Qty: 30 TABLET | Refills: 0 | Status: SHIPPED | OUTPATIENT
Start: 2024-04-26 | End: 2024-08-13

## 2024-04-05 RX ORDER — AZITHROMYCIN 250 MG/1
TABLET, FILM COATED ORAL
Qty: 6 TABLET | Refills: 0 | Status: SHIPPED | OUTPATIENT
Start: 2024-04-05 | End: 2024-04-10

## 2024-04-05 RX ORDER — LISDEXAMFETAMINE DIMESYLATE 70 MG/1
70 CAPSULE ORAL DAILY
Qty: 30 CAPSULE | Refills: 0 | Status: SHIPPED | OUTPATIENT
Start: 2024-07-19 | End: 2024-08-18

## 2024-04-05 RX ORDER — DEXTROAMPHETAMINE SACCHARATE, AMPHETAMINE ASPARTATE, DEXTROAMPHETAMINE SULFATE AND AMPHETAMINE SULFATE 5; 5; 5; 5 MG/1; MG/1; MG/1; MG/1
TABLET ORAL
Qty: 30 TABLET | Refills: 0 | Status: SHIPPED | OUTPATIENT
Start: 2024-07-19 | End: 2024-08-26 | Stop reason: ALTCHOICE

## 2024-04-05 RX ORDER — DEXTROAMPHETAMINE SACCHARATE, AMPHETAMINE ASPARTATE, DEXTROAMPHETAMINE SULFATE AND AMPHETAMINE SULFATE 5; 5; 5; 5 MG/1; MG/1; MG/1; MG/1
TABLET ORAL
Qty: 30 TABLET | Refills: 0 | Status: SHIPPED | OUTPATIENT
Start: 2024-05-24 | End: 2024-08-26 | Stop reason: ALTCHOICE

## 2024-04-05 RX ORDER — DEXTROAMPHETAMINE SACCHARATE, AMPHETAMINE ASPARTATE, DEXTROAMPHETAMINE SULFATE AND AMPHETAMINE SULFATE 5; 5; 5; 5 MG/1; MG/1; MG/1; MG/1
TABLET ORAL
Qty: 30 TABLET | Refills: 0 | Status: SHIPPED | OUTPATIENT
Start: 2024-06-21 | End: 2024-08-26 | Stop reason: ALTCHOICE

## 2024-04-05 RX ORDER — LISDEXAMFETAMINE DIMESYLATE 70 MG/1
70 CAPSULE ORAL DAILY
Qty: 30 CAPSULE | Refills: 0 | Status: SHIPPED | OUTPATIENT
Start: 2024-04-26 | End: 2024-08-13

## 2024-04-05 RX ORDER — LISDEXAMFETAMINE DIMESYLATE 70 MG/1
70 CAPSULE ORAL DAILY
Qty: 30 CAPSULE | Refills: 0 | Status: SHIPPED | OUTPATIENT
Start: 2024-05-24 | End: 2024-06-23

## 2024-04-05 RX ORDER — DEXTROAMPHETAMINE SACCHARATE, AMPHETAMINE ASPARTATE, DEXTROAMPHETAMINE SULFATE AND AMPHETAMINE SULFATE 5; 5; 5; 5 MG/1; MG/1; MG/1; MG/1
TABLET ORAL
Qty: 30 TABLET | Refills: 0 | Status: CANCELLED | OUTPATIENT
Start: 2024-04-05

## 2024-04-05 NOTE — PROGRESS NOTES
Boris is a 38 year old who is being evaluated via a billable telephone visit.    What phone number would you like to be contacted at? [phone]  How would you like to obtain your AVS? Stephanie  Originating Location (pt. Location): Home    Distant Location (provider location):  On-site    Assessment & Plan     Attention deficit hyperactivity disorder (ADHD), combined type  He would like to go back on Vyvanse, when he was taking Vyvanse he had a stable day of work.  He feels more stable, more focus..  He reports he does work long hours.  He would like to go back on the 70 mg of Vyvanse, with a low-dose of Adderall in the afternoon if needed his mood is euphoric.  Otherwise, he has no side effect with the medication    - lisdexamfetamine (VYVANSE) 70 MG capsule; Take 1 capsule (70 mg) by mouth daily  - amphetamine-dextroamphetamine (ADDERALL) 20 MG tablet; One tab at noon  - lisdexamfetamine (VYVANSE) 70 MG capsule; Take 1 capsule (70 mg) by mouth daily for 30 days  - lisdexamfetamine (VYVANSE) 70 MG capsule; Take 1 capsule (70 mg) by mouth daily for 30 days  - lisdexamfetamine (VYVANSE) 70 MG capsule; Take 1 capsule (70 mg) by mouth daily for 30 days  - amphetamine-dextroamphetamine (ADDERALL) 20 MG tablet; One tab at noon  - amphetamine-dextroamphetamine (ADDERALL) 20 MG tablet; One tab at noon  - amphetamine-dextroamphetamine (ADDERALL) 20 MG tablet; One tab at noon    Acute non-recurrent frontal sinusitis    - azithromycin (ZITHROMAX) 250 MG tablet; Take 2 tablets (500 mg) by mouth daily for 1 day, THEN 1 tablet (250 mg) daily for 4 days.      Subjective   Boris is a 38 year old, presenting for the following health issues:  Recheck Medication  Patient reports been having sinus symptoms, congestion, sinus pressure, fullness in the ears, postnasal drainage for the past week or so.  He has no fever, no chills, no cough, no short of breath.    He denies depression, suicidal thoughts or ideation, no alcohol abuse or  drugs abuse.        4/5/2024    10:54 AM   Additional Questions   Roomed by Mita SABA CMA   Accompanied by Virtual         4/5/2024    10:54 AM   Patient Reported Additional Medications   Patient reports taking the following new medications None     History of Present Illness       Reason for visit:  ADHD medication    He eats 4 or more servings of fruits and vegetables daily.He consumes 4 sweetened beverage(s) daily.He exercises with enough effort to increase his heart rate 60 or more minutes per day.  He exercises with enough effort to increase his heart rate 6 days per week.   He is taking medications regularly.       Medication Followup of Adderall  Taking Medication as prescribed: yes  Side Effects:  None  Medication Helping Symptoms:  yes        Review of Systems  Constitutional, neuro, ENT, endocrine, pulmonary, cardiac, gastrointestinal, genitourinary, musculoskeletal, integument and psychiatric systems are negative, except as otherwise noted.      Objective           Vitals:  No vitals were obtained today due to virtual visit.    Physical Exam   General: Alert and no distress //Respiratory: No audible wheeze, cough, or shortness of breath // Psychiatric:  Appropriate affect, tone, and pace of words          Phone call duration: 12 minutes  Signed Electronically by: Lanie Calixto MD

## 2024-05-24 ENCOUNTER — TELEPHONE (OUTPATIENT)
Dept: FAMILY MEDICINE | Facility: CLINIC | Age: 39
End: 2024-05-24
Payer: COMMERCIAL

## 2024-05-24 NOTE — TELEPHONE ENCOUNTER
Called and spoke with Amelie,    They do have a prescription on file that can be filled today for Vyvanse.    They will fill today for patient .    Spoke with patient and let him know it would be filled and if he wants to back to adderall he will make appointment with Dr. Calixto.    Christine M Klisch, RN

## 2024-05-24 NOTE — TELEPHONE ENCOUNTER
Dr. Calixto/Covering and Team,    Patient called stated he called our clinic earlier and was told that his vyvanse was sent to pharmacy. Patient has called [facility] twice now over the day and they still do not have script. I told pt that the script was sent so I am unsure why they have not received this.     Patient asked if he could possibly go back to his old script of adderral 20 mg tablets , 4 and a half tablets per day due to Vyvanse, even with coupon, roughly over 300 per month as insurance refuses to cover.     Per pt, if unable to have this changes today he asked if team can please see with [facility] about his vyvanse.     Thanks,  IRENA Thompson  [facility]

## 2024-08-13 ENCOUNTER — MYC REFILL (OUTPATIENT)
Dept: FAMILY MEDICINE | Facility: CLINIC | Age: 39
End: 2024-08-13
Payer: COMMERCIAL

## 2024-08-13 DIAGNOSIS — F90.2 ATTENTION DEFICIT HYPERACTIVITY DISORDER (ADHD), COMBINED TYPE: ICD-10-CM

## 2024-08-13 RX ORDER — DEXTROAMPHETAMINE SACCHARATE, AMPHETAMINE ASPARTATE, DEXTROAMPHETAMINE SULFATE AND AMPHETAMINE SULFATE 5; 5; 5; 5 MG/1; MG/1; MG/1; MG/1
TABLET ORAL
Qty: 30 TABLET | Refills: 0 | Status: SHIPPED | OUTPATIENT
Start: 2024-08-13 | End: 2024-08-26 | Stop reason: ALTCHOICE

## 2024-08-13 RX ORDER — LISDEXAMFETAMINE DIMESYLATE 70 MG/1
70 CAPSULE ORAL DAILY
Qty: 30 CAPSULE | Refills: 0 | Status: SHIPPED | OUTPATIENT
Start: 2024-08-13 | End: 2024-08-26 | Stop reason: ALTCHOICE

## 2024-08-17 ENCOUNTER — HEALTH MAINTENANCE LETTER (OUTPATIENT)
Age: 39
End: 2024-08-17

## 2024-08-23 NOTE — TELEPHONE ENCOUNTER
Routing to PCP    Patient has called multiple pharmacies for the 70 mg vyvanse and no one has it in stock    Patient asked if he could possibly go back to his old script of adderral 20 mg tablets , 4 and a half tablets per day instead due to the shortage.    RN said he will likely need a visit for this, but manoj said to ask PCP and if PCP wants a visit, he will schedule    Anika Mcgarry RN

## 2024-08-26 RX ORDER — DEXTROAMPHETAMINE SACCHARATE, AMPHETAMINE ASPARTATE, DEXTROAMPHETAMINE SULFATE AND AMPHETAMINE SULFATE 5; 5; 5; 5 MG/1; MG/1; MG/1; MG/1
90 TABLET ORAL DAILY
Qty: 135 TABLET | Refills: 0 | Status: SHIPPED | OUTPATIENT
Start: 2024-08-26 | End: 2024-09-25

## 2024-08-26 RX ORDER — DEXTROAMPHETAMINE SACCHARATE, AMPHETAMINE ASPARTATE, DEXTROAMPHETAMINE SULFATE AND AMPHETAMINE SULFATE 5; 5; 5; 5 MG/1; MG/1; MG/1; MG/1
90 TABLET ORAL DAILY
Qty: 135 TABLET | Refills: 0 | Status: SHIPPED | OUTPATIENT
Start: 2024-08-26 | End: 2024-08-26 | Stop reason: ALTCHOICE

## 2024-08-26 NOTE — TELEPHONE ENCOUNTER
Routing back to PCP    Looks like pcp discontinued all rx's   Willing to resend the script?    Anika Mcgarry RN

## 2024-08-28 ENCOUNTER — MYC REFILL (OUTPATIENT)
Dept: FAMILY MEDICINE | Facility: CLINIC | Age: 39
End: 2024-08-28
Payer: COMMERCIAL

## 2024-08-28 DIAGNOSIS — N52.8 OTHER MALE ERECTILE DYSFUNCTION: ICD-10-CM

## 2024-08-29 RX ORDER — TADALAFIL 20 MG/1
20 TABLET ORAL EVERY 24 HOURS
Qty: 20 TABLET | Refills: 11 | OUTPATIENT
Start: 2024-08-29

## 2024-09-25 ENCOUNTER — MYC REFILL (OUTPATIENT)
Dept: FAMILY MEDICINE | Facility: CLINIC | Age: 39
End: 2024-09-25
Payer: COMMERCIAL

## 2024-09-25 DIAGNOSIS — F90.2 ATTENTION DEFICIT HYPERACTIVITY DISORDER (ADHD), COMBINED TYPE: ICD-10-CM

## 2024-09-26 RX ORDER — DEXTROAMPHETAMINE SACCHARATE, AMPHETAMINE ASPARTATE, DEXTROAMPHETAMINE SULFATE AND AMPHETAMINE SULFATE 5; 5; 5; 5 MG/1; MG/1; MG/1; MG/1
90 TABLET ORAL DAILY
Qty: 135 TABLET | Refills: 0 | Status: SHIPPED | OUTPATIENT
Start: 2024-09-26 | End: 2024-09-30

## 2024-09-26 NOTE — TELEPHONE ENCOUNTER
Tc reached out to relay message no answer VM message left to call clinic      Angelic Yen    [facility]

## 2024-09-27 ENCOUNTER — TELEPHONE (OUTPATIENT)
Dept: FAMILY MEDICINE | Facility: CLINIC | Age: 39
End: 2024-09-27
Payer: COMMERCIAL

## 2024-09-27 DIAGNOSIS — F90.2 ATTENTION DEFICIT HYPERACTIVITY DISORDER (ADHD), COMBINED TYPE: Primary | ICD-10-CM

## 2024-09-27 NOTE — TELEPHONE ENCOUNTER
Pharmacy called regarding patients adderall 20 mg's script.    She stated that  is is ordered as to take 4.5 tabs, or 90 mg per day.    She stated that this is a large dosage, and for his weight, he would normally be on 40 mg per day.    Routed to PCP to advise.    Lashay LOMBARDI RN  Triage Nurse  [facility]

## 2024-09-29 RX ORDER — DEXTROAMPHETAMINE SACCHARATE, AMPHETAMINE ASPARTATE, DEXTROAMPHETAMINE SULFATE AND AMPHETAMINE SULFATE 5; 5; 5; 5 MG/1; MG/1; MG/1; MG/1
20 TABLET ORAL 2 TIMES DAILY
Qty: 60 TABLET | Refills: 0 | Status: SHIPPED | OUTPATIENT
Start: 2024-10-29 | End: 2024-11-28

## 2024-09-29 RX ORDER — DEXTROAMPHETAMINE SACCHARATE, AMPHETAMINE ASPARTATE, DEXTROAMPHETAMINE SULFATE AND AMPHETAMINE SULFATE 5; 5; 5; 5 MG/1; MG/1; MG/1; MG/1
20 TABLET ORAL 2 TIMES DAILY
Qty: 60 TABLET | Refills: 0 | Status: SHIPPED | OUTPATIENT
Start: 2024-09-29 | End: 2024-10-29

## 2024-09-29 RX ORDER — DEXTROAMPHETAMINE SACCHARATE, AMPHETAMINE ASPARTATE, DEXTROAMPHETAMINE SULFATE AND AMPHETAMINE SULFATE 5; 5; 5; 5 MG/1; MG/1; MG/1; MG/1
20 TABLET ORAL 2 TIMES DAILY
Qty: 60 TABLET | Refills: 0 | Status: SHIPPED | OUTPATIENT
Start: 2024-11-28 | End: 2024-12-28

## 2024-09-30 RX ORDER — DEXTROAMPHETAMINE SACCHARATE, AMPHETAMINE ASPARTATE, DEXTROAMPHETAMINE SULFATE AND AMPHETAMINE SULFATE 5; 5; 5; 5 MG/1; MG/1; MG/1; MG/1
90 TABLET ORAL DAILY
Qty: 135 TABLET | Refills: 0 | Status: SHIPPED | OUTPATIENT
Start: 2024-09-30

## 2024-09-30 NOTE — TELEPHONE ENCOUNTER
Called and spoke with patient.    He was taking 90 mg Adderall a day because of Vyvanse being unavaialbe.    He was taking 70 mg Vyvanse in the morning and the 20 mg adderall in the afternoon but changed in August. 2024    He would like to be on the 90 mg per day and provider will have to note it is OK on prescription.    Pharmacy pended if agreeable.    Christine M Klisch, RN

## 2024-10-27 ENCOUNTER — MYC REFILL (OUTPATIENT)
Dept: FAMILY MEDICINE | Facility: CLINIC | Age: 39
End: 2024-10-27
Payer: COMMERCIAL

## 2024-10-27 DIAGNOSIS — F90.2 ATTENTION DEFICIT HYPERACTIVITY DISORDER (ADHD), COMBINED TYPE: ICD-10-CM

## 2024-10-28 RX ORDER — DEXTROAMPHETAMINE SACCHARATE, AMPHETAMINE ASPARTATE, DEXTROAMPHETAMINE SULFATE AND AMPHETAMINE SULFATE 5; 5; 5; 5 MG/1; MG/1; MG/1; MG/1
90 TABLET ORAL DAILY
Qty: 135 TABLET | Refills: 0 | Status: SHIPPED | OUTPATIENT
Start: 2024-10-28

## 2024-11-26 ENCOUNTER — MYC REFILL (OUTPATIENT)
Dept: FAMILY MEDICINE | Facility: CLINIC | Age: 39
End: 2024-11-26
Payer: COMMERCIAL

## 2024-11-26 DIAGNOSIS — F90.2 ATTENTION DEFICIT HYPERACTIVITY DISORDER (ADHD), COMBINED TYPE: ICD-10-CM

## 2024-11-27 ENCOUNTER — TELEPHONE (OUTPATIENT)
Dept: FAMILY MEDICINE | Facility: CLINIC | Age: 39
End: 2024-11-27
Payer: COMMERCIAL

## 2024-11-27 RX ORDER — DEXTROAMPHETAMINE SACCHARATE, AMPHETAMINE ASPARTATE, DEXTROAMPHETAMINE SULFATE AND AMPHETAMINE SULFATE 5; 5; 5; 5 MG/1; MG/1; MG/1; MG/1
90 TABLET ORAL DAILY
Qty: 135 TABLET | Refills: 0 | Status: SHIPPED | OUTPATIENT
Start: 2024-11-27

## 2024-11-27 NOTE — TELEPHONE ENCOUNTER
"Prior Authorization Retail Medication Request    Medication/Dose: tadalafil (ADCIRCA/CIALIS) 20 MG tablet  Diagnosis and ICD code (if different than what is on RX):  N/A  New/renewal/insurance change PA/secondary ins. PA:  Previously Tried and Failed:  N/A  Rationale:  N/A    Insurance   Primary: N/A  Insurance ID: Key, [id number]    Secondary (if applicable): N/A  Insurance ID:  N/A    Pharmacy Information (if different than what is on RX)  Name: Same  Phone: Same  Fax: Same    Clinic Information  Preferred routing pool for dept communication: [facility] - PRIMARY CARE    A Prior Authorization has been started.  To submit the PA, please follow the instructions below:    Login to [email or web address] and \"enter a Key\"  KEY: Key, [id number]  "

## 2024-12-02 NOTE — TELEPHONE ENCOUNTER
Prior Authorization Not Needed per Insurance    Medication: TADALAFIL 20 MG PO TABS SUPER  Insurance Company: [employer]'s - Phone [phone] Fax [phone]  Expected CoPay: $    Pharmacy Filling the Rx: [id number] - [place], [place] - [address] AT [facility] [address]  Pharmacy Notified: YES  Patient Notified: **Instructed pharmacy to notify patient when script is ready to /ship.**    Patient has NACL plan, per website pharmacy benefits handled by [employer] at [phone].  Per rep they do not handle the PA and gave me the number to NACL at [phone].  Per rep they do not handle the PA.  I contacted pharmacy and they state patient always uses a coupon card and they have already reprocessed it under the coupon card.  Patient has already picked up.

## 2024-12-26 DIAGNOSIS — F90.2 ATTENTION DEFICIT HYPERACTIVITY DISORDER (ADHD), COMBINED TYPE: ICD-10-CM

## 2024-12-26 RX ORDER — DEXTROAMPHETAMINE SACCHARATE, AMPHETAMINE ASPARTATE, DEXTROAMPHETAMINE SULFATE AND AMPHETAMINE SULFATE 5; 5; 5; 5 MG/1; MG/1; MG/1; MG/1
20 TABLET ORAL 3 TIMES DAILY
Qty: 90 TABLET | Refills: 0 | Status: SHIPPED | OUTPATIENT
Start: 2024-12-26

## 2025-01-10 DIAGNOSIS — N52.8 OTHER MALE ERECTILE DYSFUNCTION: ICD-10-CM

## 2025-01-13 RX ORDER — TADALAFIL 20 MG/1
TABLET ORAL
Qty: 20 TABLET | Refills: 1 | Status: SHIPPED | OUTPATIENT
Start: 2025-01-13

## 2025-01-17 ENCOUNTER — OFFICE VISIT (OUTPATIENT)
Dept: URGENT CARE | Facility: URGENT CARE | Age: 40
End: 2025-01-17
Payer: COMMERCIAL

## 2025-01-17 VITALS
HEART RATE: 85 BPM | WEIGHT: 197 LBS | BODY MASS INDEX: 29.6 KG/M2 | SYSTOLIC BLOOD PRESSURE: 145 MMHG | TEMPERATURE: 97.7 F | OXYGEN SATURATION: 97 % | RESPIRATION RATE: 16 BRPM | DIASTOLIC BLOOD PRESSURE: 88 MMHG

## 2025-01-17 DIAGNOSIS — H91.91 SUBJECTIVE HEARING CHANGE OF RIGHT EAR: Primary | ICD-10-CM

## 2025-01-17 PROCEDURE — 99213 OFFICE O/P EST LOW 20 MIN: CPT | Performed by: PHYSICIAN ASSISTANT

## 2025-01-18 NOTE — PROGRESS NOTES
Assessment & Plan     Subjective hearing change of right ear  Likely eustacian tube congestion/dysfunction. Reassured there is no ear wax.  Trial of nasal steroid, maximize any allergy tx.  Referred to audiology for formal hearing testing.    Flonase daily.   - Adult Audiology  Referral             No follow-ups on file.    Tammy Mondragon PA-C  [facility]    Justin Escalante is a 39 year old male who presents to clinic today for the following health issues:  Chief Complaint   Patient presents with    Ear Problem     Fluid or ear wax in the right ear, it gets quiet when tip my head over. Been a few weeks now         HPI  Pt has had several week hx of plugged ear sensation R ear, fluid sound at time. Has washed out at home with some results but still sx. No rhionrrhea, congestion, cough. No facial pain or pressure. No current uri.    Takes claritin D daily for allergies.        Review of Systems  Constitutional, HEENT, cardiovascular, pulmonary, gi and gu systems are negative, except as otherwise noted.      Objective    BP (!) 145/88   Pulse 85   Temp 97.7  F (36.5  C) (Oral)   Resp 16   Wt 89.4 kg (197 lb)   SpO2 97%   BMI 29.60 kg/m    Physical Exam   Pt is in no acute distress and appears well  Ears patent B:  TM s intact, non-injected. All land marks easily visibile    Nasal mucosa is non-edematous, no discharge.    Pharynx: non erythematous, tonsils non hypertrophied, No exudate   Neck supple: no adenopathy  Skin: no rashes    No TTP maxillary or frontal sinuses.

## 2025-01-18 NOTE — PATIENT INSTRUCTIONS
Maximize allergy management and if not improved in 3 weeks get audiology testing.       Patient states she would like to speak to a nurse.  Message confirm with caller.  Call connected to [facility] triage queue

## 2025-02-10 ENCOUNTER — OFFICE VISIT (OUTPATIENT)
Dept: FAMILY MEDICINE | Facility: CLINIC | Age: 40
End: 2025-02-10
Payer: COMMERCIAL

## 2025-02-10 VITALS
HEIGHT: 68 IN | BODY MASS INDEX: 29.67 KG/M2 | WEIGHT: 195.8 LBS | HEART RATE: 82 BPM | SYSTOLIC BLOOD PRESSURE: 132 MMHG | OXYGEN SATURATION: 95 % | RESPIRATION RATE: 20 BRPM | DIASTOLIC BLOOD PRESSURE: 86 MMHG | TEMPERATURE: 98.5 F

## 2025-02-10 DIAGNOSIS — Z12.5 SCREENING FOR PROSTATE CANCER: ICD-10-CM

## 2025-02-10 DIAGNOSIS — F90.2 ATTENTION DEFICIT HYPERACTIVITY DISORDER (ADHD), COMBINED TYPE: Primary | ICD-10-CM

## 2025-02-10 LAB — PSA SERPL DL<=0.01 NG/ML-MCNC: 0.9 NG/ML (ref 0–2.5)

## 2025-02-10 PROCEDURE — 36415 COLL VENOUS BLD VENIPUNCTURE: CPT | Performed by: FAMILY MEDICINE

## 2025-02-10 PROCEDURE — 99214 OFFICE O/P EST MOD 30 MIN: CPT | Performed by: FAMILY MEDICINE

## 2025-02-10 PROCEDURE — G0103 PSA SCREENING: HCPCS | Performed by: FAMILY MEDICINE

## 2025-02-10 RX ORDER — LISDEXAMFETAMINE DIMESYLATE 70 MG/1
70 CAPSULE ORAL EVERY MORNING
Qty: 90 CAPSULE | Refills: 0 | Status: SHIPPED | OUTPATIENT
Start: 2025-02-10

## 2025-02-10 RX ORDER — DEXTROAMPHETAMINE SACCHARATE, AMPHETAMINE ASPARTATE, DEXTROAMPHETAMINE SULFATE AND AMPHETAMINE SULFATE 5; 5; 5; 5 MG/1; MG/1; MG/1; MG/1
TABLET ORAL
Qty: 90 TABLET | Refills: 0 | Status: SHIPPED | OUTPATIENT
Start: 2025-02-10

## 2025-02-10 NOTE — PROGRESS NOTES
"  Assessment & Plan     Attention deficit hyperactivity disorder (ADHD), combined type    He would like to go back on his Vyvanse at 70 mg in the morning, in addition, take the 20 mg, in the evening as needed for working longer hours.    Previously, was taking Adderall 20 mg 3 times daily.  Maximum dose 60 mg.    - lisdexamfetamine (VYVANSE) 70 MG capsule; Take 1 capsule (70 mg) by mouth every morning.  - amphetamine-dextroamphetamine (ADDERALL) 20 MG tablet; One tab at noon ( 3 months )    Screening for prostate cancer  Screening.  - PSA, screen; Future  - PSA, screen    Patient has no side effect with medication, denies depression, denies alcohol use or drug use.      Follow up in 3 months for Annual exam.     [place] Prescription Monitoring Program, ( ) referenced. No indication of misuse, or abuse.      BMI  Estimated body mass index is 29.5 kg/m  as calculated from the following:    Height as of this encounter: 1.735 m (5' 8.31\").    Weight as of this encounter: 88.8 kg (195 lb 12.8 oz).   Weight management plan: Discussed healthy diet and exercise guidelines      Work on weight loss  Regular exercise    Justin Escalante is a 39 year old, presenting for the following health issues:  Recheck Medication  History of ADHD,  Father been diagnosed with prostate cancer        2/10/2025    10:35 AM   Additional Questions   Roomed by rosalio sexton ma   Accompanied by self         2/10/2025    10:35 AM   Patient Reported Additional Medications   Patient reports taking the following new medications none     History of Present Illness       Reason for visit:  Medication maintenance follow-up    He eats 2-3 servings of fruits and vegetables daily.He consumes 2 sweetened beverage(s) daily.He exercises with enough effort to increase his heart rate 60 or more minutes per day.  He exercises with enough effort to increase his heart rate 6 days per week.   He is taking medications regularly.       Medication Followup of " "Adderall  Taking Medication as prescribed: yes  Side Effects:  None  Medication Helping Symptoms:  yes, but Vyvanse works better and would like to be back on Vyvanse.        Review of Systems  Constitutional, HEENT, cardiovascular, pulmonary, GI, , musculoskeletal, neuro, skin, endocrine and psych systems are negative, except as otherwise noted.      Objective    /86 (BP Location: Right arm, Patient Position: Sitting, Cuff Size: Adult Large)   Pulse 82   Temp 98.5  F (36.9  C) (Oral)   Resp 20   Ht 1.735 m (5' 8.31\")   Wt 88.8 kg (195 lb 12.8 oz)   SpO2 95%   BMI 29.50 kg/m    Body mass index is 29.5 kg/m .  Physical Exam   GENERAL: alert and no distress  HENT: ear canals and TM's normal, nose and mouth without ulcers or lesions  RESP: lungs clear to auscultation - no rales, rhonchi or wheezes  CV: regular rate and rhythm, normal S1 S2, no S3 or S4, no murmur, click or rub, no peripheral edema  ABDOMEN: soft, nontender, no hepatosplenomegaly, no masses and bowel sounds normal  PSYCH: mentation appears normal, affect normal/bright    Orders Placed This Encounter   Procedures    REVIEW OF HEALTH MAINTENANCE PROTOCOL ORDERS    PSA, screen            Signed Electronically by: Lanie Calixto MD    "

## 2025-02-11 ENCOUNTER — MYC MEDICAL ADVICE (OUTPATIENT)
Dept: FAMILY MEDICINE | Facility: CLINIC | Age: 40
End: 2025-02-11
Payer: COMMERCIAL

## 2025-02-11 ENCOUNTER — TELEPHONE (OUTPATIENT)
Dept: FAMILY MEDICINE | Facility: CLINIC | Age: 40
End: 2025-02-11
Payer: COMMERCIAL

## 2025-02-11 NOTE — TELEPHONE ENCOUNTER
Routing to Pa team    Patient needing PA on Adderall and Vyvanase.    My Chart    Hi Dr Calixto,     Looks like a prior authorization is required for both prescriptions. The pharmacy next door said I should send you a note about the prior authorization. Not sure what, if anything, you need from me. Thanks       Previously, was taking Adderall 20 mg 3 times daily.  Maximum dose 60 mg.     - lisdexamfetamine (VYVANSE) 70 MG capsule; Take 1 capsule (70 mg) by mouth every morning.  - amphetamine-dextroamphetamine (ADDERALL) 20 MG tablet; One tab at noon ( 3 months )       Marquez Herrera, RN, BSN, PHN  [facility]

## 2025-02-11 NOTE — TELEPHONE ENCOUNTER
See TE      RN replied to patient via [employer]. See message for details.     Marquez Herrera RN, BSN, PHN  [facility] [place]

## 2025-02-14 ENCOUNTER — TELEPHONE (OUTPATIENT)
Dept: FAMILY MEDICINE | Facility: CLINIC | Age: 40
End: 2025-02-14
Payer: COMMERCIAL

## 2025-02-14 NOTE — TELEPHONE ENCOUNTER
Prior Authorization Retail Medication Request    Medication/Dose: lisdexamfetamine (VYVANSE) 70 MG capsule  Diagnosis and ICD code (if different than what is on RX):  N/A  New/renewal/insurance change PA/secondary ins. PA:  Previously Tried and Failed: N/A  Rationale: N/A    Insurance   Primary: N/A  Insurance ID: N/A    Secondary (if applicable): N/A  Insurance ID: N/A    Pharmacy Information (if different than what is on RX)  Name: Same  Phone: Same  Fax: Same    Clinic Information  Preferred routing pool for dept communication: [facility] - [facility] CARE

## 2025-02-15 NOTE — TELEPHONE ENCOUNTER
Prior Authorization Approval    Medication: AMPHETAMINE-DEXTROAMPHETAMINE 20 MG PO TABS  Authorization Effective Date: 1/16/2025  Authorization Expiration Date: 2/15/2026  Approved Dose/Quantity: 90 per 90 DS  Reference #: [id number]   Insurance Company: [employer] - Phone [phone] Fax [phone]  Expected CoPay: $    CoPay Card Available:      Financial Assistance Needed:   Which Pharmacy is filling the prescription: [facility] - [address]  Pharmacy Notified: yes  Patient Notified: yes        Thank you,    Aria Fan  Oncology Pharmacy Liaison II  [email or web address]  Phone: [phone]  Fax: [phone]

## 2025-02-15 NOTE — TELEPHONE ENCOUNTER
Prior Authorization Approval    Medication: VYVANSE 70 MG PO CAPS  Authorization Effective Date: 1/16/2025  Authorization Expiration Date: 2/15/2026  Approved Dose/Quantity: 30 per 30 Ds  Reference #: [id number]   Insurance Company: [employer] - Phone [phone] Fax [phone]  Expected CoPay: $    CoPay Card Available:      Financial Assistance Needed:    Which Pharmacy is filling the prescription: [facility] [address]  Pharmacy Notified: yes  Patient Notified: yes        Thank you,    Aria Fan  Oncology Pharmacy Liaison II  [email or web address]  Phone: [phone]  Fax: [phone]

## 2025-03-12 ENCOUNTER — MYC REFILL (OUTPATIENT)
Dept: FAMILY MEDICINE | Facility: CLINIC | Age: 40
End: 2025-03-12
Payer: COMMERCIAL

## 2025-03-12 DIAGNOSIS — N52.8 OTHER MALE ERECTILE DYSFUNCTION: ICD-10-CM

## 2025-03-12 RX ORDER — TADALAFIL 20 MG/1
TABLET ORAL
Qty: 20 TABLET | Refills: 1 | OUTPATIENT
Start: 2025-03-12

## 2025-03-12 RX ORDER — TADALAFIL 20 MG/1
20 TABLET ORAL DAILY PRN
Qty: 20 TABLET | Refills: 5 | Status: SHIPPED | OUTPATIENT
Start: 2025-03-12

## 2025-05-07 ENCOUNTER — MYC REFILL (OUTPATIENT)
Dept: FAMILY MEDICINE | Facility: CLINIC | Age: 40
End: 2025-05-07
Payer: COMMERCIAL

## 2025-05-07 DIAGNOSIS — F90.2 ATTENTION DEFICIT HYPERACTIVITY DISORDER (ADHD), COMBINED TYPE: ICD-10-CM

## 2025-05-07 RX ORDER — DEXTROAMPHETAMINE SACCHARATE, AMPHETAMINE ASPARTATE, DEXTROAMPHETAMINE SULFATE AND AMPHETAMINE SULFATE 5; 5; 5; 5 MG/1; MG/1; MG/1; MG/1
TABLET ORAL
Qty: 90 TABLET | Refills: 0 | Status: SHIPPED | OUTPATIENT
Start: 2025-05-07

## 2025-05-07 RX ORDER — LISDEXAMFETAMINE DIMESYLATE 70 MG/1
70 CAPSULE ORAL EVERY MORNING
Qty: 90 CAPSULE | Refills: 0 | Status: SHIPPED | OUTPATIENT
Start: 2025-05-07

## 2025-05-07 NOTE — TELEPHONE ENCOUNTER
Medications refills     [place] Prescription Monitoring Program, ( ) referenced. No indication of misuse, or abuse.

## 2025-07-25 DIAGNOSIS — F90.2 ATTENTION DEFICIT HYPERACTIVITY DISORDER (ADHD), COMBINED TYPE: ICD-10-CM

## 2025-07-28 RX ORDER — DEXTROAMPHETAMINE SACCHARATE, AMPHETAMINE ASPARTATE, DEXTROAMPHETAMINE SULFATE AND AMPHETAMINE SULFATE 5; 5; 5; 5 MG/1; MG/1; MG/1; MG/1
TABLET ORAL
Qty: 90 TABLET | Refills: 0 | Status: SHIPPED | OUTPATIENT
Start: 2025-07-28

## 2025-08-11 ENCOUNTER — OFFICE VISIT (OUTPATIENT)
Dept: FAMILY MEDICINE | Facility: CLINIC | Age: 40
End: 2025-08-11

## 2025-08-11 VITALS
SYSTOLIC BLOOD PRESSURE: 137 MMHG | BODY MASS INDEX: 29.1 KG/M2 | RESPIRATION RATE: 16 BRPM | OXYGEN SATURATION: 100 % | HEART RATE: 74 BPM | HEIGHT: 68 IN | WEIGHT: 192 LBS | TEMPERATURE: 97.6 F | DIASTOLIC BLOOD PRESSURE: 86 MMHG

## 2025-08-11 DIAGNOSIS — N52.8 OTHER MALE ERECTILE DYSFUNCTION: ICD-10-CM

## 2025-08-11 DIAGNOSIS — M54.6 ACUTE RIGHT-SIDED THORACIC BACK PAIN: ICD-10-CM

## 2025-08-11 DIAGNOSIS — M62.830 BACK MUSCLE SPASM: Primary | ICD-10-CM

## 2025-08-11 PROCEDURE — G2211 COMPLEX E/M VISIT ADD ON: HCPCS | Performed by: FAMILY MEDICINE

## 2025-08-11 PROCEDURE — 99214 OFFICE O/P EST MOD 30 MIN: CPT | Performed by: FAMILY MEDICINE

## 2025-08-11 RX ORDER — DIAZEPAM 5 MG/1
5 TABLET ORAL EVERY 6 HOURS PRN
Qty: 12 TABLET | Refills: 0 | Status: SHIPPED | OUTPATIENT
Start: 2025-08-11

## 2025-08-11 RX ORDER — TADALAFIL 20 MG/1
20 TABLET ORAL DAILY PRN
Qty: 20 TABLET | Refills: 5 | Status: SHIPPED | OUTPATIENT
Start: 2025-08-11

## 2025-08-11 RX ORDER — PREDNISONE 10 MG/1
TABLET ORAL
Qty: 11 TABLET | Refills: 0 | Status: SHIPPED | OUTPATIENT
Start: 2025-08-11 | End: 2025-08-21

## 2025-08-15 DIAGNOSIS — F90.2 ATTENTION DEFICIT HYPERACTIVITY DISORDER (ADHD), COMBINED TYPE: ICD-10-CM

## 2025-08-16 RX ORDER — LISDEXAMFETAMINE DIMESYLATE 70 MG/1
70 CAPSULE ORAL EVERY MORNING
Qty: 90 CAPSULE | Refills: 0 | Status: SHIPPED | OUTPATIENT
Start: 2025-08-16

## 2025-08-18 ENCOUNTER — TELEPHONE (OUTPATIENT)
Dept: FAMILY MEDICINE | Facility: CLINIC | Age: 40
End: 2025-08-18
Payer: COMMERCIAL

## 2025-08-27 ENCOUNTER — THERAPY VISIT (OUTPATIENT)
Age: 40
End: 2025-08-27

## 2025-08-27 DIAGNOSIS — M62.830 BACK MUSCLE SPASM: ICD-10-CM

## 2025-08-27 DIAGNOSIS — M54.6 ACUTE RIGHT-SIDED THORACIC BACK PAIN: ICD-10-CM

## 2025-08-27 PROCEDURE — 97161 PT EVAL LOW COMPLEX 20 MIN: CPT | Mod: GP

## 2025-08-27 PROCEDURE — 97110 THERAPEUTIC EXERCISES: CPT | Mod: GP
